# Patient Record
Sex: MALE | Race: BLACK OR AFRICAN AMERICAN | Employment: OTHER | URBAN - METROPOLITAN AREA
[De-identification: names, ages, dates, MRNs, and addresses within clinical notes are randomized per-mention and may not be internally consistent; named-entity substitution may affect disease eponyms.]

---

## 2021-06-18 ENCOUNTER — OFFICE VISIT (OUTPATIENT)
Dept: FAMILY MEDICINE CLINIC | Age: 61
End: 2021-06-18
Payer: COMMERCIAL

## 2021-06-18 VITALS
TEMPERATURE: 98.7 F | WEIGHT: 186 LBS | HEART RATE: 95 BPM | OXYGEN SATURATION: 95 % | RESPIRATION RATE: 16 BRPM | SYSTOLIC BLOOD PRESSURE: 115 MMHG | HEIGHT: 68 IN | DIASTOLIC BLOOD PRESSURE: 69 MMHG | BODY MASS INDEX: 28.19 KG/M2

## 2021-06-18 DIAGNOSIS — Z12.11 COLON CANCER SCREENING: ICD-10-CM

## 2021-06-18 DIAGNOSIS — N13.8 BPH WITH OBSTRUCTION/LOWER URINARY TRACT SYMPTOMS: Primary | ICD-10-CM

## 2021-06-18 DIAGNOSIS — N40.1 BPH WITH OBSTRUCTION/LOWER URINARY TRACT SYMPTOMS: Primary | ICD-10-CM

## 2021-06-18 PROCEDURE — 99213 OFFICE O/P EST LOW 20 MIN: CPT | Performed by: FAMILY MEDICINE

## 2021-06-18 RX ORDER — PHENOL/SODIUM PHENOLATE
20 AEROSOL, SPRAY (ML) MUCOUS MEMBRANE DAILY
COMMUNITY

## 2021-06-18 NOTE — PROGRESS NOTES
1. Have you been to the ER, urgent care clinic since your last visit? Hospitalized since your last visit? No    2. Have you seen or consulted any other health care providers outside of the [address] [place] [address] since your last visit? Include any pap smears or colon screening. No     6/18/2021      Chief Complaint   Patient presents with    Physical     Complete Physical, needs colonoscopy and routine BW         History of Present Illness:        Niki Hong is a 64 y.o. male here to discuss health maintenance and BPH. Stopped drinking this year, feels well. Lipids, BS in good range in 10/2019. Would like to do colon cancer screening. Nocturia x 2, otherwise feels great. Has to take PPI daily. No Known Allergies    Current Outpatient Medications   Medication Sig    Omeprazole delayed release (PRILOSEC D/R) 20 mg tablet Take 20 mg by mouth daily.  latanoprost (XALATAN) 0.005 % ophthalmic solution      No current facility-administered medications for this visit. Physical Examination:    Visit Vitals  /69 (BP 1 Location: Right arm, BP Patient Position: Sitting, BP Cuff Size: Adult)   Pulse 95   Temp 98.7 °F (37.1 °C) (Oral)   Resp 16   Ht 5' 8\" (1.727 m)   Wt 186 lb (84.4 kg)   SpO2 95%   BMI 28.28 kg/m²      General:  Alert, cooperative, no distress. HEENT:  Normocephalic, without obvious abnormality, atraumatic. Conjunctivae/corneas clear. Pupils equal, round, reactive to light. Extraocular movements intact. TMs and external canals normal bilaterally. Nasal mucosa and oropharynx clear. Lungs: Clear to auscultation bilaterally. Chest wall:  No tenderness or deformity. Heart:  Regular rate and rhythm, S1, S2 normal, no murmur, click, rub, or gallop. Abdomen:   Soft, non-tender. Bowel sounds normal. No masses. No organomegaly. Extremities: Extremities normal, atraumatic, no cyanosis or edema. Pulses: 2+ and symmetric all extremities.    Skin: Skin color, texture, turgor normal. No rashes or lesions. Lymph nodes: Cervical, supraclavicular, and axillary nodes normal.   Neurologic: CNII-XII intact. Normal strength, sensation, and reflexes throughout. ASSESSMENT AND PLAN    1. BPH with obstruction/lower urinary tract symptoms    - PSA W/ REFLX FREE PSA; Future    2. Colon cancer screening    - REFERRAL TO GENERAL SURGERY            Orders Placed This Encounter    PSA W/ REFLX FREE PSA     Standing Status:   Future     Standing Expiration Date:   6/18/2022    REFERRAL TO GENERAL SURGERY     Referral Priority:   Routine     Referral Type:   Consultation     Referral Reason:   Specialty Services Required     Referred to Provider:   Inocencio Urban MD     Requested Specialty:   General Surgery     Number of Visits Requested:   1    Omeprazole delayed release (PRILOSEC D/R) 20 mg tablet     Sig: Take 20 mg by mouth daily.            Homero Spears MD

## 2021-06-20 LAB
PSA SERPL-MCNC: 1 NG/ML (ref 0–4)
REFLEX CRITERIA: NORMAL

## 2021-08-25 ENCOUNTER — OFFICE VISIT (OUTPATIENT)
Dept: SURGERY | Age: 61
End: 2021-08-25

## 2021-08-25 VITALS
HEIGHT: 68 IN | BODY MASS INDEX: 29.4 KG/M2 | WEIGHT: 194 LBS | HEART RATE: 74 BPM | RESPIRATION RATE: 18 BRPM | DIASTOLIC BLOOD PRESSURE: 77 MMHG | SYSTOLIC BLOOD PRESSURE: 126 MMHG

## 2021-08-25 DIAGNOSIS — Z12.11 COLON CANCER SCREENING: Primary | ICD-10-CM

## 2021-08-25 NOTE — PATIENT INSTRUCTIONS

## 2021-08-25 NOTE — PROGRESS NOTES
Parris Jung is a 64 y.o. male who presents today with the following:  Chief Complaint   Patient presents with    Colon Cancer Screening       HPI  57-year-old male who presents as a referral from Dr. Armani Kim for possible screening colonoscopy. He has had no prior colon evaluation. He has no personal history of colon cancer. He has no first-degree relative who has had colon cancer although he does believe that he has maternal uncles and a maternal grandfather who may have had rectal or colon cancer. He states he used to have some rectal bleeding occasionally when he used to drink alcohol but he stopped this 6 months ago and has had no further bleeding. He denies any melena or hematochezia. He denies any diarrhea or constipation or abdominal pain or unexpected weight loss. He denies any change in the caliber of his stool and has not had any recent stool testing. It sounds like his had a laparoscopic cholecystectomy that was subsequently complicated post operatively by choledocholithiasis that presented 2 to 3 months later. He believes he may have had 2 ERCPs with removal of stone. He has a history of glaucoma. He has been vaccinated for Covid and has not contracted Covid as far as he knows. He stopped smoking cigars about 2 years ago. He stopped alcohol 6 months ago. He is not on any blood thinners. No past medical history on file.     Past Surgical History:   Procedure Laterality Date    HX CHOLECYSTECTOMY  2000       Social History     Socioeconomic History    Marital status: SINGLE     Spouse name: Not on file    Number of children: 3    Years of education: 8    Highest education level: Not on file   Occupational History    Occupation: labor   Tobacco Use    Smoking status: Former Smoker     Packs/day: 0.25     Years: 20.00     Pack years: 5.00    Smokeless tobacco: Never Used   Substance and Sexual Activity    Alcohol use: Not Currently     Comment: up to one beer daily    Drug use: Not Currently    Sexual activity: Yes   Other Topics Concern    Not on file   Social History Narrative    Not on file     Social Determinants of Health     Financial Resource Strain:     Difficulty of Paying Living Expenses:    Food Insecurity:     Worried About Running Out of Food in the Last Year:     [address] in the Last Year:    Transportation Needs:     Lack of Transportation (Medical):  Lack of Transportation (Non-Medical):    Physical Activity:     Days of Exercise per Week:     Minutes of Exercise per Session:    Stress:     Feeling of Stress :    Social Connections:     Frequency of Communication with Friends and Family:     Frequency of Social Gatherings with Friends and Family:     Attends Ca[employer] Services:     Active Member of Clubs or Organizations:     Attends Club or Organization Meetings:     Marital Status:    Intimate Partner Violence:     Fear of Current or Ex-Partner:     Emotionally Abused:     Physically Abused:     Sexually Abused:        Family History   Problem Relation Age of Onset    Diabetes Mother         type 2     Hypertension Mother     Lung Disease Father         asbestosis    No Known Problems Sister     No Known Problems Sister     No Known Problems Sister     No Known Problems Sister        No Known Allergies    Current Outpatient Medications   Medication Sig    Omeprazole delayed release (PRILOSEC D/R) 20 mg tablet Take 20 mg by mouth daily.  latanoprost (XALATAN) 0.005 % ophthalmic solution      No current facility-administered medications for this visit. The above histories, medications and allergies have been reviewed. Review of Systems   Respiratory: Positive for cough and wheezing. Gastrointestinal: Positive for abdominal pain and heartburn.        Visit Vitals  /77 (BP 1 Location: Left upper arm, BP Patient Position: At rest, BP Cuff Size: Adult)   Pulse 74   Resp 18   Ht 5' 8\" (1.727 m)   Wt 194 lb (88 kg)   BMI 29.50 kg/m²     Physical Exam  Constitutional:       Appearance: Normal appearance. Cardiovascular:      Rate and Rhythm: Normal rate and regular rhythm. Pulmonary:      Comments: And occasional wheeze but overall clear to auscultation  Abdominal:      General: There is no distension. Palpations: Abdomen is soft. There is no mass. Tenderness: There is no abdominal tenderness. Neurological:      Mental Status: He is alert. 1. Colon cancer screening  Recommend colonoscopy. The procedure was explained in detail including the risks and benefits. Risks shared included risks of missed lesions, incomplete exam, colon injury or perforation. Risks associated with anesthesia were also discussed. The patient wishes to proceed and we will schedule. The patient was counseled at length about the risks of chuy Covid-19 during their perioperative period and any recovery window from their procedure. The patient was made aware that chuy Covid-19  may worsen their prognosis for recovering from their procedure and lend to a higher morbidity and/or mortality risk. All material risks, benefits, and reasonable alternatives including postponing the procedure were discussed. The patient does  wish to proceed with the procedure at this time. Follow-up and Dispositions    · Return for post procedure.          Warden Efra MD

## 2021-08-27 ENCOUNTER — TELEPHONE (OUTPATIENT)
Dept: SURGERY | Age: 61
End: 2021-08-27

## 2021-08-27 NOTE — TELEPHONE ENCOUNTER
Spoke with Tameka from [place], no prior Auth required for Colonoscopy scheduled 9/21/21, [facility] does not give call ref #, Authorization form faxed to [phone]

## 2021-09-02 ENCOUNTER — ANESTHESIA EVENT (OUTPATIENT)
Dept: SURGERY | Age: 61
End: 2021-09-02
Payer: COMMERCIAL

## 2021-09-02 NOTE — ANESTHESIA PREPROCEDURE EVALUATION
Relevant Problems   No relevant active problems       Anesthetic History   No history of anesthetic complications            Review of Systems / Medical History  Patient summary reviewed, nursing notes reviewed and pertinent labs reviewed    Pulmonary              Pertinent negatives: Smoker: former.      Neuro/Psych   Within defined limits           Cardiovascular  Within defined limits                Exercise tolerance: <4 METS     GI/Hepatic/Renal     GERD: well controlled           Endo/Other  Within defined limits           Other Findings            Physical Exam    Airway  Mallampati: III  TM Distance: 4 - 6 cm  Neck ROM: normal range of motion   Mouth opening: Normal     Cardiovascular  Regular rate and rhythm,  S1 and S2 normal,  no murmur, click, rub, or gallop    Rate: normal         Dental         Pulmonary  Breath sounds clear to auscultation               Abdominal  GI exam deferred       Other Findings            Anesthetic Plan    ASA: 2  Anesthesia type: MAC            Anesthetic plan and risks discussed with: Patient

## 2021-09-15 ENCOUNTER — HOSPITAL ENCOUNTER (OUTPATIENT)
Dept: PREADMISSION TESTING | Age: 61
Discharge: HOME | End: 2021-09-15

## 2021-09-15 NOTE — PERIOP NOTES
[address] [place] [address]  SURGICAL PRE-ADMISSION INSTRUCTIONS    ARRIVAL  · You will be called the day before your surgery with your expected arrival time. · Sign in at the  of the hospital.  You will be directed to the Surgical Waiting Room. · Please arrive at your scheduled appointment time. You have been scheduled to arrive for your procedure one or two hours prior to the expected start time of your procedure. · Every effort will be made to minimize your wait but please be aware that unforeseen circumstances may affect our schedule. EATING  · DO NOT EAT OR DRINK ANYTHING AFTER MIDNIGHT ON THE EVENING BEFORE YOUR SURGERY OR ON THE DAY OF YOUR SURGERY except for your medications (as instructed) with a sip of water. · Do not use gum, mints or lozenges on the morning of your surgery. · Please do not smoke or chew tobacco before your surgery. MEDICATIONS   · Take the following medications on the morning of your surgery with the smallest amount of water possible : Pantoprazole    STOP THESE MEDICATIONS AT THE TIMES LISTED BELOW  none     DRIVING/TRANSPORATION  · Have a responsible adult to drive you home from the hospital and to stay with you over night. Please have them plan to remain in the hospital during your surgery. Your surgery will not be done if you do not have a responsible adult to take you home and to stay with you. · If you have arranged for public transport, you must have a responsible adult to ride with you (who is not the ). · You may not drive for 24 hours after anesthesia. PREPARATION  · If you have a Living WiIl/Advance Directive, please bring a copy with you to scan into your chart. · Please DO NOT wear makeup or nail polish  · Please leave valuables at home,  DO NOT wear jewelry. · Wear loose, comfortable clothing that is large enough to cover a bulky dressing.     SPECIAL INSTRUCTIONS:  · Follow your surgeon's instructions for preoperative bowel prep.     Reviewed above preoperative instructions and answered questions by phone interview    Patient:  Tin Del   Date:     September 15, 2021  Time:   2:14 PM    RN:  Odalis Henry RN    Date:     September 15, 2021  Time:   2:14 PM

## 2021-09-17 ENCOUNTER — HOSPITAL ENCOUNTER (OUTPATIENT)
Dept: PREADMISSION TESTING | Age: 61
Discharge: HOME | End: 2021-09-17
Payer: COMMERCIAL

## 2021-09-17 PROCEDURE — U0005 INFEC AGEN DETEC AMPLI PROBE: HCPCS

## 2021-09-20 LAB
SARS-COV-2, XPLCVT: NOT DETECTED
SOURCE, COVRS: NORMAL

## 2021-09-20 NOTE — H&P
History and Physical    HPI  31-year-old male who presents as a referral from Dr. Neville Abdi for possible screening colonoscopy. He has had no prior colon evaluation. He has no personal history of colon cancer. He has no first-degree relative who has had colon cancer although he does believe that he has maternal uncles and a maternal grandfather who may have had rectal or colon cancer. He states he used to have some rectal bleeding occasionally when he used to drink alcohol but he stopped this 6 months ago and has had no further bleeding. He denies any melena or hematochezia. He denies any diarrhea or constipation or abdominal pain or unexpected weight loss. He denies any change in the caliber of his stool and has not had any recent stool testing. It sounds like his had a laparoscopic cholecystectomy that was subsequently complicated post operatively by choledocholithiasis that presented 2 to 3 months later. He believes he may have had 2 ERCPs with removal of stone. He has a history of glaucoma. He has been vaccinated for Covid and has not contracted Covid as far as he knows. He stopped smoking cigars about 2 years ago. He stopped alcohol 6 months ago. He is not on any blood thinners. No past medical history on file.     Past Surgical History:   Procedure Laterality Date    HX CHOLECYSTECTOMY  2000       Social History     Socioeconomic History    Marital status: SINGLE     Spouse name: Not on file    Number of children: 3    Years of education: 8    Highest education level: Not on file   Occupational History    Occupation: labor   Tobacco Use    Smoking status: Former Smoker     Packs/day: 0.25     Years: 20.00     Pack years: 5.00    Smokeless tobacco: Never Used   Substance and Sexual Activity    Alcohol use: Not Currently     Comment: up to one beer daily    Drug use: Not Currently    Sexual activity: Yes   Other Topics Concern    Not on file   Social History Narrative    Not on file     Social Determinants of Health     Financial Resource Strain:     Difficulty of Paying Living Expenses:    Food Insecurity:     Worried About Running Out of Food in the Last Year:     [address] in the Last Year:    Transportation Needs:     Lack of Transportation (Medical):  Lack of Transportation (Non-Medical):    Physical Activity:     Days of Exercise per Week:     Minutes of Exercise per Session:    Stress:     Feeling of Stress :    Social Connections:     Frequency of Communication with Friends and Family:     Frequency of Social Gatherings with Friends and Family:     Attends Adventism Services:     Active Member of Clubs or Organizations:     Attends Club or Organization Meetings:     Marital Status:    Intimate Partner Violence:     Fear of Current or Ex-Partner:     Emotionally Abused:     Physically Abused:     Sexually Abused:        Family History   Problem Relation Age of Onset    Diabetes Mother         type 2     Hypertension Mother     Lung Disease Father         asbestosis    No Known Problems Sister     No Known Problems Sister     No Known Problems Sister     No Known Problems Sister        No Known Allergies    No current facility-administered medications for this encounter. Current Outpatient Medications   Medication Sig    Omeprazole delayed release (PRILOSEC D/R) 20 mg tablet Take 20 mg by mouth daily.  latanoprost (XALATAN) 0.005 % ophthalmic solution        The above histories, medications and allergies have been reviewed. Review of Systems   Respiratory: Positive for cough and wheezing. Gastrointestinal: Positive for abdominal pain and heartburn. There were no vitals taken for this visit. Physical Exam  Constitutional:       Appearance: Normal appearance. Cardiovascular:      Rate and Rhythm: Normal rate and regular rhythm. Pulmonary:      Comments:  And occasional wheeze but overall clear to auscultation  Abdominal: General: There is no distension. Palpations: Abdomen is soft. There is no mass. Tenderness: There is no abdominal tenderness. Neurological:      Mental Status: He is alert. 1. Colon cancer screening  Recommend colonoscopy. The procedure was explained in detail including the risks and benefits. Risks shared included risks of missed lesions, incomplete exam, colon injury or perforation. Risks associated with anesthesia were also discussed. The patient wishes to proceed and we will schedule. The patient was counseled at length about the risks of chuy Covid-19 during their perioperative period and any recovery window from their procedure. The patient was made aware that chuy Covid-19  may worsen their prognosis for recovering from their procedure and lend to a higher morbidity and/or mortality risk. All material risks, benefits, and reasonable alternatives including postponing the procedure were discussed. The patient does  wish to proceed with the procedure at this time.               Jie Carvalho MD

## 2021-09-21 ENCOUNTER — HOSPITAL ENCOUNTER (OUTPATIENT)
Age: 61
Setting detail: OUTPATIENT SURGERY
Discharge: HOME | End: 2021-09-21
Attending: SURGERY | Admitting: SURGERY
Payer: COMMERCIAL

## 2021-09-21 ENCOUNTER — ANESTHESIA (OUTPATIENT)
Dept: SURGERY | Age: 61
End: 2021-09-21
Payer: COMMERCIAL

## 2021-09-21 VITALS
BODY MASS INDEX: 29.4 KG/M2 | SYSTOLIC BLOOD PRESSURE: 116 MMHG | OXYGEN SATURATION: 94 % | HEART RATE: 79 BPM | TEMPERATURE: 97 F | HEIGHT: 68 IN | RESPIRATION RATE: 19 BRPM | WEIGHT: 194 LBS | DIASTOLIC BLOOD PRESSURE: 80 MMHG

## 2021-09-21 DIAGNOSIS — Z12.11 COLON CANCER SCREENING: ICD-10-CM

## 2021-09-21 PROCEDURE — 74011250636 HC RX REV CODE- 250/636: Performed by: ANESTHESIOLOGY

## 2021-09-21 PROCEDURE — 88305 TISSUE EXAM BY PATHOLOGIST: CPT

## 2021-09-21 PROCEDURE — 2709999900 HC NON-CHARGEABLE SUPPLY: Performed by: SURGERY

## 2021-09-21 PROCEDURE — 45380 COLONOSCOPY AND BIOPSY: CPT | Performed by: SURGERY

## 2021-09-21 PROCEDURE — 74011000250 HC RX REV CODE- 250: Performed by: ANESTHESIOLOGY

## 2021-09-21 PROCEDURE — 76210000063 HC OR PH I REC FIRST 0.5 HR: Performed by: SURGERY

## 2021-09-21 PROCEDURE — 76060000031 HC ANESTHESIA FIRST 0.5 HR: Performed by: SURGERY

## 2021-09-21 PROCEDURE — 76010000154 HC OR TIME FIRST 0.5 HR: Performed by: SURGERY

## 2021-09-21 PROCEDURE — 74011250636 HC RX REV CODE- 250/636: Performed by: SURGERY

## 2021-09-21 PROCEDURE — 77030037006 HC FCPS BIOP ENDOSC DISP OCOA -A: Performed by: SURGERY

## 2021-09-21 RX ORDER — OXYCODONE AND ACETAMINOPHEN 5; 325 MG/1; MG/1
1 TABLET ORAL
Status: DISCONTINUED | OUTPATIENT
Start: 2021-09-21 | End: 2021-09-21 | Stop reason: HOSPADM

## 2021-09-21 RX ORDER — SODIUM CHLORIDE 0.9 % (FLUSH) 0.9 %
5-40 SYRINGE (ML) INJECTION EVERY 8 HOURS
Status: DISCONTINUED | OUTPATIENT
Start: 2021-09-21 | End: 2021-09-21 | Stop reason: HOSPADM

## 2021-09-21 RX ORDER — MIDAZOLAM HYDROCHLORIDE 1 MG/ML
INJECTION, SOLUTION INTRAMUSCULAR; INTRAVENOUS AS NEEDED
Status: DISCONTINUED | OUTPATIENT
Start: 2021-09-21 | End: 2021-09-21 | Stop reason: HOSPADM

## 2021-09-21 RX ORDER — SODIUM CHLORIDE 0.9 % (FLUSH) 0.9 %
5-40 SYRINGE (ML) INJECTION AS NEEDED
Status: DISCONTINUED | OUTPATIENT
Start: 2021-09-21 | End: 2021-09-21 | Stop reason: HOSPADM

## 2021-09-21 RX ORDER — SODIUM CHLORIDE, SODIUM LACTATE, POTASSIUM CHLORIDE, CALCIUM CHLORIDE 600; 310; 30; 20 MG/100ML; MG/100ML; MG/100ML; MG/100ML
125 INJECTION, SOLUTION INTRAVENOUS CONTINUOUS
Status: DISCONTINUED | OUTPATIENT
Start: 2021-09-21 | End: 2021-09-21 | Stop reason: HOSPADM

## 2021-09-21 RX ORDER — ONDANSETRON 2 MG/ML
INJECTION INTRAMUSCULAR; INTRAVENOUS AS NEEDED
Status: DISCONTINUED | OUTPATIENT
Start: 2021-09-21 | End: 2021-09-21 | Stop reason: HOSPADM

## 2021-09-21 RX ORDER — PROPOFOL 10 MG/ML
INJECTION, EMULSION INTRAVENOUS AS NEEDED
Status: DISCONTINUED | OUTPATIENT
Start: 2021-09-21 | End: 2021-09-21 | Stop reason: HOSPADM

## 2021-09-21 RX ORDER — LIDOCAINE HYDROCHLORIDE 20 MG/ML
INJECTION, SOLUTION EPIDURAL; INFILTRATION; INTRACAUDAL; PERINEURAL AS NEEDED
Status: DISCONTINUED | OUTPATIENT
Start: 2021-09-21 | End: 2021-09-21 | Stop reason: HOSPADM

## 2021-09-21 RX ADMIN — PROPOFOL 50 MG: 10 INJECTION, EMULSION INTRAVENOUS at 09:27

## 2021-09-21 RX ADMIN — PROPOFOL 10 MG: 10 INJECTION, EMULSION INTRAVENOUS at 09:44

## 2021-09-21 RX ADMIN — PROPOFOL 20 MG: 10 INJECTION, EMULSION INTRAVENOUS at 09:34

## 2021-09-21 RX ADMIN — PROPOFOL 10 MG: 10 INJECTION, EMULSION INTRAVENOUS at 09:50

## 2021-09-21 RX ADMIN — PROPOFOL 10 MG: 10 INJECTION, EMULSION INTRAVENOUS at 09:46

## 2021-09-21 RX ADMIN — SODIUM CHLORIDE, POTASSIUM CHLORIDE, SODIUM LACTATE AND CALCIUM CHLORIDE 125 ML/HR: 600; 310; 30; 20 INJECTION, SOLUTION INTRAVENOUS at 08:55

## 2021-09-21 RX ADMIN — PROPOFOL 20 MG: 10 INJECTION, EMULSION INTRAVENOUS at 09:40

## 2021-09-21 RX ADMIN — PROPOFOL 20 MG: 10 INJECTION, EMULSION INTRAVENOUS at 09:29

## 2021-09-21 RX ADMIN — MIDAZOLAM 2 MG: 1 INJECTION INTRAMUSCULAR; INTRAVENOUS at 09:24

## 2021-09-21 RX ADMIN — PROPOFOL 20 MG: 10 INJECTION, EMULSION INTRAVENOUS at 09:32

## 2021-09-21 RX ADMIN — ONDANSETRON 4 MG: 2 INJECTION INTRAMUSCULAR; INTRAVENOUS at 09:24

## 2021-09-21 RX ADMIN — PROPOFOL 20 MG: 10 INJECTION, EMULSION INTRAVENOUS at 09:38

## 2021-09-21 RX ADMIN — PROPOFOL 20 MG: 10 INJECTION, EMULSION INTRAVENOUS at 09:30

## 2021-09-21 RX ADMIN — PROPOFOL 20 MG: 10 INJECTION, EMULSION INTRAVENOUS at 09:42

## 2021-09-21 RX ADMIN — LIDOCAINE HYDROCHLORIDE 80 MG: 20 INJECTION, SOLUTION EPIDURAL; INFILTRATION; INTRACAUDAL; PERINEURAL at 09:26

## 2021-09-21 RX ADMIN — PROPOFOL 20 MG: 10 INJECTION, EMULSION INTRAVENOUS at 09:36

## 2021-09-21 RX ADMIN — PROPOFOL 20 MG: 10 INJECTION, EMULSION INTRAVENOUS at 09:48

## 2021-09-21 NOTE — DISCHARGE INSTRUCTIONS
Patient Education        Colonoscopy: What to Expect at Home  Your Recovery  After a colonoscopy, you'll stay at the clinic until you wake up. Then you can go home. But you'll need to arrange for a ride. Your doctor will tell you when you can eat and do your other usual activities. Your doctor will talk to you about when you'll need your next colonoscopy. Your doctor can help you decide how often you need to be checked. This will depend on the results of your test and your risk for colorectal cancer. After the test, you may be bloated or have gas pains. You may need to pass gas. If a biopsy was done or a polyp was removed, you may have streaks of blood in your stool (feces) for a few days. Problems such as heavy rectal bleeding may not occur until several weeks after the test. This isn't common. But it can happen after polyps are removed. This care sheet gives you a general idea about how long it will take for you to recover. But each person recovers at a different pace. Follow the steps below to get better as quickly as possible. How can you care for yourself at home? Activity    · Rest when you feel tired.     · You can do your normal activities when it feels okay to do so. Diet    · Follow your doctor's directions for eating.     · Unless your doctor has told you not to, drink plenty of fluids. This helps to replace the fluids that were lost during the colon prep.     · Do not drink alcohol. Medicines    · Your doctor will tell you if and when you can restart your medicines. You will also be given instructions about taking any new medicines.     · If you take aspirin or some other blood thinner, ask your doctor if and when to start taking it again. Make sure that you understand exactly what your doctor wants you to do.     · If polyps were removed or a biopsy was done during the test, your doctor may tell you not to take aspirin or other anti-inflammatory medicines for a few days.  These include ibuprofen (Advil, Motrin) and naproxen (Aleve). Other instructions    · For your safety, do not drive or operate machinery until the medicine wears off and you can think clearly. Your doctor may tell you not to drive or operate machinery until the day after your test.     · Do not sign legal documents or make major decisions until the medicine wears off and you can think clearly. The anesthesia can make it hard for you to fully understand what you are agreeing to. Follow-up care is a key part of your treatment and safety. Be sure to make and go to all appointments, and call your doctor if you are having problems. It's also a good idea to know your test results and keep a list of the medicines you take. When should you call for help? Call 911 anytime you think you may need emergency care. For example, call if:    · You passed out (lost consciousness).     · You pass maroon or bloody stools.     · You have trouble breathing. Call your doctor now or seek immediate medical care if:    · You have pain that does not get better after you take pain medicine.     · You are sick to your stomach or cannot drink fluids.     · You have new or worse belly pain.     · You have blood in your stools.     · You have a fever.     · You cannot pass stools or gas. Watch closely for changes in your health, and be sure to contact your doctor if you have any problems. Where can you learn more? Go to http://www.gray.com/  Enter E264 in the search box to learn more about \"Colonoscopy: What to Expect at Home. \"  Current as of: December 17, 2020               Content Version: 13.0  © 9433-6037 [employer]. Care instructions adapted under license by [employer] (which disclaims liability or warranty for this information).  If you have questions about a medical condition or this instruction, always ask your healthcare professional. Chano Mcneal disclaims any warranty or liability for your use of this information.

## 2021-09-21 NOTE — OP NOTES
[facility]  OPERATIVE REPORT    Name:  Vivek Cueva  MR#:  [id number]  :  1960  ACCOUNT #:  [de-identified]  DATE OF SERVICE:  2021    PREOPERATIVE DIAGNOSIS:  Screening colonoscopy. POSTOPERATIVE DIAGNOSIS:  Screening colonoscopy. PROCEDURE PERFORMED:  Colonoscopy with cold biopsy polypectomy x2. SURGEON:  Jocelyne Beavers MD    ASSISTANT:  None    ANESTHESIA:  MAC.    COMPLICATIONS:  None. SPECIMENS REMOVED:  1. Polyp at 25 cm. 2.  Polyp at 20 cm. IMPLANTS:  None    ESTIMATED BLOOD LOSS:  Minimal.    FINDINGS:  1.  Polyp at 25 cm. 2.  Polyp at 20 cm. DISPOSITION:  Stable. PROCEDURE:  The patient was brought to the operative theater. Monitoring devices and nasal cannula O2 were placed per Anesthesia, and the patient was placed in left side down decubitus position. IV sedation was administered, and a time-out was performed. Digital rectal exam was performed which was essentially normal.  A well-lubricated Olympus colonoscope was introduced in the patient's rectum and advanced to the cecum without any difficulty. The cecum was identified by identification of ileocecal valve and the appendiceal orifice. The prep was adequate to proceed. The scope was then carefully withdrawn with the mucosa circumferentially evaluated. No significant abnormalities were noted in the cecum, ascending colon, transverse colon, or descending colon. No significant lesions were identified in the sigmoid colon. At 25 cm, we identified a small polypoid lesion which was removed in its entirety by cold biopsy forceps with specimen retrieved. A similar-type lesion was found at 20 cm and removed in a similar fashion. The scope was then pulled back into the rectum and retroflexed without any additional lesions seen. The scope was then carefully withdrawn. The patient tolerated procedure well and was transferred to PACU in stable condition.       Maria Luisa Mcmillan, MD [id number]  D:  09/21/2021 9:59  T:  09/21/2021 12:09  JOB #:  [id number]  CC:  Baldo Queen MD

## 2021-09-21 NOTE — INTERVAL H&P NOTE
Update History & Physical    The Patient's History and Physical of September 20, 2021 was reviewed with the patient and I examined the patient. There was no change. The surgical site was confirmed by the patient and me. Plan:  The risk, benefits, expected outcome, and alternative to the recommended procedure have been discussed with the patient. Patient understands and wants to proceed with the procedure.     Electronically signed by Meghann Hagan MD on 9/21/2021 at 9:18 AM
no hematuria/no flank pain R/no renal colic/no urine discoloration/no urinary hesitancy/normal urinary frequency/no nocturia/no flank pain L

## 2021-09-21 NOTE — ANESTHESIA POSTPROCEDURE EVALUATION
Post-Anesthesia Evaluation and Assessment    Cardiovascular Function/Vital Signs  Visit Vitals  BP 99/78   Pulse 77   Temp 36.1 °C (97 °F)   Resp 15   Ht 5' 8\" (1.727 m)   Wt 88 kg (194 lb)   SpO2 96%   BMI 29.50 kg/m²       Patient is status post Procedure(s) with comments:  COLONOSCOPY - with Cold Forcep Polypectomy of Polyp at 25cm and Polyp at 20cm. Nausea/Vomiting: Controlled. Postoperative hydration reviewed and adequate. Pain:  Pain Scale 1: Numeric (0 - 10) (09/21/21 1000)  Pain Intensity 1: 0 (09/21/21 1000)   Managed. Neurological Status:   Neuro (WDL): Within Defined Limits (09/21/21 1000)   At baseline. Mental Status and Level of Consciousness: Baseline and stable. Pulmonary Status:   O2 Device: CO2 nasal cannula (09/21/21 1000)   Adequate oxygenation and airway patent. Complications related to anesthesia: None    Post-anesthesia assessment completed. No concerns. Patient has met all discharge requirements.     Signed By: Richard Prakash MD

## 2021-09-21 NOTE — BRIEF OP NOTE
Brief Postoperative Note    Patient: Nicola Fernandez  YOB: 1960  MRN: [id number]    Date of Procedure: 9/21/2021     Pre-Op Diagnosis: Screening Colonoscopy    Post-Op Diagnosis: Same as preoperative diagnosis. Procedure(s):  COLONOSCOPY with cold biopsy polypectomy X 2    Surgeon(s):  Montrell Gamez MD    Surgical Assistant: None    Anesthesia: MAC     Estimated Blood Loss (mL): Minimal    Complications: None    Specimens:   ID Type Source Tests Collected by Time Destination   1 : Polyp at 25cm Preservative Colon  Montrell Gamez MD 9/21/2021 3268 Pathology   2 : Polyp at 20cm Preservative Colon  Montrell Gamez MD 9/21/2021 7674 Pathology        Implants: * No implants in log *    Drains: * No LDAs found *    Findings: 1.  Polyps at 25 cm & 20 cm    Electronically Signed by Tanmay Tolbert MD on 9/21/2021 at 9:56 AM

## 2021-09-29 ENCOUNTER — OFFICE VISIT (OUTPATIENT)
Dept: FAMILY MEDICINE CLINIC | Age: 61
End: 2021-09-29
Payer: COMMERCIAL

## 2021-09-29 VITALS
BODY MASS INDEX: 29.89 KG/M2 | WEIGHT: 197.25 LBS | OXYGEN SATURATION: 95 % | HEIGHT: 68 IN | DIASTOLIC BLOOD PRESSURE: 69 MMHG | HEART RATE: 81 BPM | TEMPERATURE: 97.3 F | SYSTOLIC BLOOD PRESSURE: 135 MMHG | RESPIRATION RATE: 18 BRPM

## 2021-09-29 DIAGNOSIS — Z23 ENCOUNTER FOR IMMUNIZATION: ICD-10-CM

## 2021-09-29 DIAGNOSIS — R06.2 WHEEZING ON EXPIRATION: Primary | ICD-10-CM

## 2021-09-29 PROBLEM — D12.6 ADENOMATOUS POLYP OF COLON: Status: ACTIVE | Noted: 2021-09-21

## 2021-09-29 PROCEDURE — 90471 IMMUNIZATION ADMIN: CPT | Performed by: FAMILY MEDICINE

## 2021-09-29 PROCEDURE — 94640 AIRWAY INHALATION TREATMENT: CPT | Performed by: FAMILY MEDICINE

## 2021-09-29 PROCEDURE — 90686 IIV4 VACC NO PRSV 0.5 ML IM: CPT | Performed by: FAMILY MEDICINE

## 2021-09-29 PROCEDURE — 99214 OFFICE O/P EST MOD 30 MIN: CPT | Performed by: FAMILY MEDICINE

## 2021-09-29 RX ORDER — PREDNISONE 20 MG/1
TABLET ORAL
Qty: 18 TABLET | Refills: 0 | Status: SHIPPED | OUTPATIENT
Start: 2021-09-29 | End: 2021-10-08

## 2021-09-29 RX ORDER — ALBUTEROL SULFATE 0.83 MG/ML
2.5 SOLUTION RESPIRATORY (INHALATION) ONCE
Qty: 1 EACH | Refills: 0
Start: 2021-09-29 | End: 2021-09-29

## 2021-09-29 RX ORDER — ALBUTEROL SULFATE 90 UG/1
2 AEROSOL, METERED RESPIRATORY (INHALATION)
Qty: 18 G | Refills: 1 | Status: SHIPPED | OUTPATIENT
Start: 2021-09-29

## 2021-09-29 NOTE — PROGRESS NOTES
1. Have you been to the ER, urgent care clinic since your last visit? Hospitalized since your last visit? Yes - 9/21/21 - Colonoscopy Dr Rajinder Oh     2. Have you seen or consulted any other health care providers outside of the [address] [place] [address] since your last visit? Include any pap smears or colon screening. No     9/29/2021      Chief Complaint   Patient presents with    Wheezing     x 4 months     Shortness of Breath         History of Present Illness:        Justyna Cuba is a 64 y.o. male who has noted audible wheezing and some SOB, primarily at night over the last few weeks. Helped someone move prior to this and may have been exposed to allergens, woman also had a cough. He has no cough, fever or URI sxs. No hx of childhood asthma, used to wheeze some when he was smoking. Feels well otherwise. Just had colonoscopy with a single adenomatous polyp. No Known Allergies    Current Outpatient Medications   Medication Sig    albuterol (PROVENTIL VENTOLIN) 2.5 mg /3 mL (0.083 %) nebu 3 mL by Nebulization route once for 1 dose.  albuterol (PROVENTIL HFA, VENTOLIN HFA, PROAIR HFA) 90 mcg/actuation inhaler Take 2 Puffs by inhalation every four (4) hours as needed for Wheezing.  inhalational spacing device 1 Each by Does Not Apply route as needed for Wheezing.  predniSONE (DELTASONE) 20 mg tablet Take 60 mg by mouth daily for 3 days, THEN 40 mg daily for 3 days, THEN 20 mg daily for 3 days.  Omeprazole delayed release (PRILOSEC D/R) 20 mg tablet Take 20 mg by mouth daily.  latanoprost (XALATAN) 0.005 % ophthalmic solution      No current facility-administered medications for this visit.            Physical Examination:    Visit Vitals  /69 (BP 1 Location: Left upper arm, BP Patient Position: Sitting, BP Cuff Size: Adult)   Pulse 81   Temp 97.3 °F (36.3 °C) (Oral)   Resp 18   Ht 5' 8\" (1.727 m)   Wt 197 lb 4 oz (89.5 kg)   SpO2 95%   BMI 29.99 kg/m²      General:  Alert, cooperative, no distress. HEENT:  Normocephalic, without obvious abnormality, atraumatic. Conjunctivae/corneas clear. Pupils equal, round, reactive to light. Extraocular movements intact. TMs and external canals normal bilaterally. Nasal mucosa and oropharynx clear. Lungs: Symmetric bilateral mid field wheezes. No crackles. Significant improvement post nebulizer. Chest wall:  No tenderness or deformity. Heart:  Regular rate and rhythm, S1, S2 normal, no murmur, click, rub, or gallop. Abdomen:   Soft, non-tender. Bowel sounds normal. No masses. No organomegaly. Extremities: Extremities normal, atraumatic, no cyanosis or edema. Pulses: 2+ and symmetric all extremities. Skin: Skin color, texture, turgor normal. No rashes or lesions. Lymph nodes: Cervical, supraclavicular, and axillary nodes normal.   Neurologic: CNII-XII intact. Normal strength, sensation, and reflexes throughout. ASSESSMENT AND PLAN    1. Wheezing on expiration  b agonist trial. PFTs if not prompt resolution with steroid and normal CXR. Return if not resolved in 3-4 seeks  - albuterol (PROVENTIL VENTOLIN) 2.5 mg /3 mL (0.083 %) nebu; 3 mL by Nebulization route once for 1 dose. Dispense: 1 Each; Refill: 0  - ALBUTEROL, INHAL. SOL., FDA-APPROVED FINAL, NON-COMPOUND UNIT DOSE, 1 MG  - INHAL RX, AIRWAY OBST/DX SPUTUM INDUCT  - XR CHEST PA LAT; Future  - albuterol (PROVENTIL HFA, VENTOLIN HFA, PROAIR HFA) 90 mcg/actuation inhaler; Take 2 Puffs by inhalation every four (4) hours as needed for Wheezing. Dispense: 18 g; Refill: 1  - inhalational spacing device; 1 Each by Does Not Apply route as needed for Wheezing. Dispense: 1 Each; Refill: 0  - predniSONE (DELTASONE) 20 mg tablet; Take 60 mg by mouth daily for 3 days, THEN 40 mg daily for 3 days, THEN 20 mg daily for 3 days. Dispense: 18 Tablet; Refill: 0    2. Encounter for immunization    - INFLUENZA VIRUS VAC QUAD,SPLIT,PRESV FREE SYRINGE IM    3.  Colon polyp      Orders Placed This Encounter  INHAL RX, AIRWAY OBST/DX SPUTUM INDUCT ([id number])    XR CHEST PA LAT     Standing Status:   Future     Standing Expiration Date:   10/30/2022     Order Specific Question:   Which facility to perform procedure? Answer:   [id number] [facility] VACCINE QUADRIVALENT, PRESERVATIVE FREE SYRINGE ([id number])    ALBUTEROL, INHAL. WWS.()     Order Specific Question:   Dose     Answer:   mg/3 mL     Order Specific Question:   Site     Answer:   OTHER     Comments:   inhalation     Order Specific Question:   Expiration Date     Answer:   2022     Order Specific Question:   Lot#     Answer:   [id number]     Order Specific Question:        Answer:   Nephron     Order Specific Question:   Charge Quantity? Answer:   1     Order Specific Question:   Perfomed by/Witnessed by: Elver Munguia RN     Order Specific Question:   NDC#     Answer:   [id number] [[id number]]    albuterol (PROVENTIL VENTOLIN) 2.5 mg /3 mL (0.083 %) nebu     Sig: 3 mL by Nebulization route once for 1 dose. Dispense:  1 Each     Refill:  0    albuterol (PROVENTIL HFA, VENTOLIN HFA, PROAIR HFA) 90 mcg/actuation inhaler     Sig: Take 2 Puffs by inhalation every four (4) hours as needed for Wheezing. Dispense:  18 g     Refill:  1    inhalational spacing device     Si Each by Does Not Apply route as needed for Wheezing. Dispense:  1 Each     Refill:  0    predniSONE (DELTASONE) 20 mg tablet     Sig: Take 60 mg by mouth daily for 3 days, THEN 40 mg daily for 3 days, THEN 20 mg daily for 3 days.      Dispense:  18 Tablet     Refill:  0           Estefania Villar MD

## 2021-09-29 NOTE — PATIENT INSTRUCTIONS
Wheezing or Bronchoconstriction: Care Instructions  Your Care Instructions  Wheezing is a whistling noise made during breathing. It occurs when the small airways, or bronchial tubes, that lead to your lungs swell or contract (spasm) and become narrow. This narrowing is called bronchoconstriction. When your airways constrict, it is hard for air to pass through and this makes it hard for you to breathe. Wheezing and bronchoconstriction can be caused by many problems, including:  · An infection such as the flu or a cold. · Allergies such as hay fever. · Diseases such as asthma or chronic obstructive pulmonary disease. · Smoking. Treatment for your wheezing depends on what is causing the problem. Your wheezing may get better without treatment. But you may need to pay attention to things that cause your wheezing and avoid them. Or you may need medicine to help treat the wheezing and to reduce the swelling or to relieve spasms in your lungs. Follow-up care is a key part of your treatment and safety. Be sure to make and go to all appointments, and call your doctor if you are having problems. It is also a good idea to know your test results and keep a list of the medicines you take. How can you care for yourself at home? · Take your medicine exactly as prescribed. Call your doctor if you think you are having a problem with your medicine. You will get more details on the specific medicine your doctor prescribes. · If your doctor prescribed antibiotics, take them as directed. Do not stop taking them just because you feel better. You need to take the full course of antibiotics. · Breathe moist air from a humidifier, hot shower, or sink filled with hot water. This may help ease your symptoms and make it easier for you to breathe. · If you have congestion in your nose and throat, drinking plenty of fluids, especially hot fluids, may help relieve your symptoms.  If you have kidney, heart, or liver disease and have to limit fluids, talk with your doctor before you increase the amount of fluids you drink. · If you have mucus in your airways, it may help to breathe deeply and cough. · Do not smoke or allow others to smoke around you. Smoking can make your wheezing worse. If you need help quitting, talk to your doctor about stop-smoking programs and medicines. These can increase your chances of quitting for good. · Avoid things that may cause your wheezing. These may include colds, smoke, air pollution, dust, pollen, pets, cockroaches, stress, and cold air. When should you call for help? Call 911 anytime you think you may need emergency care. For example, call if:    · You have severe trouble breathing.     · You passed out (lost consciousness). Call your doctor now or seek immediate medical care if:    · You cough up yellow, dark brown, or bloody mucus (sputum).     · You have new or worse shortness of breath.     · Your wheezing is not getting better or it gets worse after you start taking your medicine. Watch closely for changes in your health, and be sure to contact your doctor if:    · You do not get better as expected. Where can you learn more? Go to http://www.gray.com/  Enter V454 in the search box to learn more about \"Wheezing or Bronchoconstriction: Care Instructions. \"  Current as of: July 6, 2021               Content Version: 13.0  © 5886-9488 Healthwise, Incorporated. Care instructions adapted under license by [employer] (which disclaims liability or warranty for this information). If you have questions about a medical condition or this instruction, always ask your healthcare professional. [place] [address] any warranty or liability for your use of this information. Learning About Using Inhalers Correctly  Why is an inhaler used? An inhaler is used to send medicine right to your lungs.  It's often used for asthma, COPD (chronic obstructive pulmonary disease), and other lung diseases that make it hard to breathe. Using an inhaler:  · Sends most of the medicine straight to your lungs. · Provides a measured dose of the medicine. · Can help keep your symptoms under control. · Can limit long-term damage to your lungs. · Can be safer than if you took a pill or liquid medicine. · Works just as well, is easier to carry, and is faster to use than a nebulizer machine. What devices can you use? Different types of devices can send inhaled medicine straight to your lungs. They include metered-dose inhalers and dry powder inhalers. You may need to use more than one type of device. How can you use it correctly? Each kind of inhaler is used differently. Make sure to save the 's instructions. Don't throw them away. Pay attention to the instructions for your specific inhaler. Look for directions on:  · Whether to prime or shake it. · Whether to use a spacer or mask. · Whether you have to load medicine into the inhaler. · How to use the inhaler to deliver the medicine. This includes how to hold it, when to breathe, and how long to hold your breath. · How to know how many doses are left. · How to clean it. · How to store it. · When to throw it away. Why is it important to use it correctly? Correct use of an inhaler:  · Makes sure that the lungs get the full dose. This can mean fewer symptoms and better treatment. · Leads to fewer side effects. Sometimes people have side effects if the medicine hits the inside of the mouth instead of going into the lungs. · Saves money. Ask your doctor, nurse, pharmacist, or respiratory therapist to show you how to use the inhaler. They might ask you to show them how you use it, so they can help fix any problems. Follow-up care is a key part of your treatment and safety. Be sure to make and go to all appointments, and call your doctor if you are having problems.  It's also a good idea to know your test results and keep a list of the medicines you take. Where can you learn more? Go to http://www.gray.com/  Enter I150 in the search box to learn more about \"Learning About Using Inhalers Correctly. \"  Current as of: July 6, 2021               Content Version: 13.0  © 9594-3639 [employer]. Care instructions adapted under license by [employer] (which disclaims liability or warranty for this information). If you have questions about a medical condition or this instruction, always ask your healthcare professional. [address] any warranty or liability for your use of this information. Learning About Using Inhalers Correctly  Why is an inhaler used? An inhaler is used to send medicine right to your lungs. It's often used for asthma, COPD (chronic obstructive pulmonary disease), and other lung diseases that make it hard to breathe. Using an inhaler:  · Sends most of the medicine straight to your lungs. · Provides a measured dose of the medicine. · Can help keep your symptoms under control. · Can limit long-term damage to your lungs. · Can be safer than if you took a pill or liquid medicine. · Works just as well, is easier to carry, and is faster to use than a nebulizer machine. What devices can you use? Different types of devices can send inhaled medicine straight to your lungs. They include metered-dose inhalers and dry powder inhalers. You may need to use more than one type of device. How can you use it correctly? Each kind of inhaler is used differently. Make sure to save the 's instructions. Don't throw them away. Pay attention to the instructions for your specific inhaler. Look for directions on:  · Whether to prime or shake it. · Whether to use a spacer or mask. · Whether you have to load medicine into the inhaler. · How to use the inhaler to deliver the medicine.  This includes how to hold it, when to breathe, and how long to hold your breath. · How to know how many doses are left. · How to clean it. · How to store it. · When to throw it away. Why is it important to use it correctly? Correct use of an inhaler:  · Makes sure that the lungs get the full dose. This can mean fewer symptoms and better treatment. · Leads to fewer side effects. Sometimes people have side effects if the medicine hits the inside of the mouth instead of going into the lungs. · Saves money. Ask your doctor, nurse, pharmacist, or respiratory therapist to show you how to use the inhaler. They might ask you to show them how you use it, so they can help fix any problems. Follow-up care is a key part of your treatment and safety. Be sure to make and go to all appointments, and call your doctor if you are having problems. It's also a good idea to know your test results and keep a list of the medicines you take. Where can you learn more? Go to http://www.gray.com/  Enter I150 in the search box to learn more about \"Learning About Using Inhalers Correctly. \"  Current as of: July 6, 2021               Content Version: 13.0  © 7060-9043 Healthwise, [employer]. Care instructions adapted under license by [employer] (which disclaims liability or warranty for this information). If you have questions about a medical condition or this instruction, always ask your healthcare professional. [place] [address] any warranty or liability for your use of this information.

## 2021-10-01 ENCOUNTER — OFFICE VISIT (OUTPATIENT)
Dept: SURGERY | Age: 61
End: 2021-10-01
Payer: COMMERCIAL

## 2021-10-01 ENCOUNTER — HOSPITAL ENCOUNTER (OUTPATIENT)
Dept: GENERAL RADIOLOGY | Age: 61
Discharge: HOME | End: 2021-10-01
Payer: COMMERCIAL

## 2021-10-01 VITALS
WEIGHT: 199 LBS | HEIGHT: 70 IN | HEART RATE: 63 BPM | BODY MASS INDEX: 28.49 KG/M2 | DIASTOLIC BLOOD PRESSURE: 89 MMHG | SYSTOLIC BLOOD PRESSURE: 132 MMHG

## 2021-10-01 DIAGNOSIS — R06.2 WHEEZING ON EXPIRATION: ICD-10-CM

## 2021-10-01 DIAGNOSIS — Z86.010 HX OF COLONIC POLYPS: Primary | ICD-10-CM

## 2021-10-01 PROCEDURE — 71046 X-RAY EXAM CHEST 2 VIEWS: CPT

## 2021-10-01 PROCEDURE — 99213 OFFICE O/P EST LOW 20 MIN: CPT | Performed by: SURGERY

## 2021-10-01 NOTE — PROGRESS NOTES
[address] [facility] Surgery      Clinic Note - Follow up    [address] [place] [address] Dr returns for scheduled follow up today after colonoscopy that was performed back on September 21. He has been doing well since procedure. 2 polyps were removed at the time of colonoscopy with 1 of these being an adenomatous polyp. It was 3 mm in size. No other significant abnormalities were found. He states he noticed a little bit of pink-tinged fluid yesterday but otherwise has been doing well. Objective     Visit Vitals  /89   Pulse 63   Ht 5' 10\" (1.778 m)   Wt 199 lb (90.3 kg)   BMI 28.55 kg/m²         PE  GEN - Awake, alert, communicating appropriately. NAD      Assessment     Ric Garcia is a 64 y. o.yr old male status post colonoscopy with findings of a single 3 mm tubular adenoma at 25 cm. Plan     Would recommend a repeat colonoscopy in 5 years or sooner if he has any new problems.     Niesha Hendrickson MD    CC: Dr. Gaylin Essex

## 2021-10-01 NOTE — PATIENT INSTRUCTIONS
High-Fiber Diet: Care Instructions  Overview     A high-fiber diet may help you relieve constipation and feel less bloated. Your doctor and dietitian will help you make a high-fiber eating plan based on your personal needs. The plan will include the things you like to eat. It will also make sure that you get 25 to 35 grams of fiber a day. Before you make changes to the way you eat, be sure to talk with your doctor or dietitian. Follow-up care is a key part of your treatment and safety. Be sure to make and go to all appointments, and call your doctor if you are having problems. It's also a good idea to know your test results and keep a list of the medicines you take. How can you care for yourself at home? · You can increase how much fiber you get if you eat more of certain foods. These foods include:  ? Whole-grain breads and cereals. ? Fruits, such as pears, apples, and peaches. Eat the skins and peels if you can.  ? Vegetables, such as broccoli, cabbage, spinach, carrots, asparagus, and squash. ? Starchy vegetables. These include potatoes with skins, kidney beans, and lima beans. · Take a fiber supplement every day if your doctor recommends it. Examples are Benefiber, Citrucel, FiberCon, and Metamucil. Ask your doctor how much to take. · Drink plenty of fluids. If you have kidney, heart, or liver disease and have to limit fluids, talk with your doctor before you increase the amount of fluids you drink. Where can you learn more? Go to http://www.gray.com/  Enter Z403 in the search box to learn more about \"High-Fiber Diet: Care Instructions. \"  Current as of: December 17, 2020               Content Version: 13.0  © 2006-2021 Healthwise, Incorporated. Care instructions adapted under license by [employer] (which disclaims liability or warranty for this information).  If you have questions about a medical condition or this instruction, always ask your healthcare professional. [address] any warranty or liability for your use of this information.

## 2021-10-01 NOTE — PROGRESS NOTES
Spoke with patient after verifying Name, and , informed patient of Xray results per Dr. Valentina Black . Patient given an opportunity to ask questions, repeated information, and verbalized understanding.

## 2022-03-18 PROBLEM — D12.6 ADENOMATOUS POLYP OF COLON: Status: ACTIVE | Noted: 2021-09-21

## 2022-08-25 NOTE — LETTER
10/1/2021    Patient: Kian Avery   YOB: 1960   Date of Visit: 10/1/2021     Ruddy Gonzalez MD  [facility] [place] [address]  Via In Basket    Dear Ruddy Gonzalez MD,      Thank you for referring Mr. Stephanie Smith to [facility] for evaluation. My notes for this consultation are attached. If you have questions, please do not hesitate to call me. I look forward to following your patient along with you.       Sincerely,    Renee Sanders MD
negative

## 2022-09-21 ENCOUNTER — TELEPHONE (OUTPATIENT)
Dept: FAMILY MEDICINE CLINIC | Age: 62
End: 2022-09-21

## 2022-09-21 NOTE — TELEPHONE ENCOUNTER
Unable to reach patient regarding his request for PSA lab, mailbox is full. LOV 9/29/2021. Appointment needed.

## 2022-09-21 NOTE — TELEPHONE ENCOUNTER
----- Message from Otis Talamantes sent at 9/21/2022  2:51 PM EDT -----  Subject: Referral Request    Reason for referral request? Pty would like bloodwork for prostate to be   completed. Please notify pt when orders are put in his chart. Provider patient wants to be referred to(if known):     Provider Phone Number(if known):     Additional Information for Provider?   ---------------------------------------------------------------------------  --------------  [address]    [phone]; OK to leave message on voicemail  ---------------------------------------------------------------------------  --------------

## 2022-09-29 NOTE — LETTER
8/25/2021    Patient: Argelia Fagan   YOB: 1960   Date of Visit: 8/25/2021     Lily Jimenez MD  [address] [facility] [address]  Via In Basket    Dear Lily Jimenez MD,      Thank you for referring Mr. Aida Vicente to [address] [place] [address] [facility] for evaluation. My notes for this consultation are attached. If you have questions, please do not hesitate to call me. I look forward to following your patient along with you.       Sincerely,    Warden Efra MD 08:00

## 2022-10-04 ENCOUNTER — OFFICE VISIT (OUTPATIENT)
Dept: FAMILY MEDICINE CLINIC | Age: 62
End: 2022-10-04
Payer: COMMERCIAL

## 2022-10-04 VITALS
TEMPERATURE: 97.3 F | RESPIRATION RATE: 18 BRPM | HEIGHT: 70 IN | WEIGHT: 196.25 LBS | SYSTOLIC BLOOD PRESSURE: 106 MMHG | BODY MASS INDEX: 28.1 KG/M2 | HEART RATE: 76 BPM | DIASTOLIC BLOOD PRESSURE: 70 MMHG | OXYGEN SATURATION: 97 %

## 2022-10-04 DIAGNOSIS — N40.1 BPH WITH OBSTRUCTION/LOWER URINARY TRACT SYMPTOMS: Primary | ICD-10-CM

## 2022-10-04 DIAGNOSIS — N13.8 BPH WITH OBSTRUCTION/LOWER URINARY TRACT SYMPTOMS: Primary | ICD-10-CM

## 2022-10-04 DIAGNOSIS — Z23 ENCOUNTER FOR IMMUNIZATION: ICD-10-CM

## 2022-10-04 PROBLEM — Q15.9: Status: ACTIVE | Noted: 2022-10-04

## 2022-10-04 PROBLEM — H40.53X1: Status: ACTIVE | Noted: 2022-10-04

## 2022-10-04 PROCEDURE — 36415 COLL VENOUS BLD VENIPUNCTURE: CPT | Performed by: FAMILY MEDICINE

## 2022-10-04 PROCEDURE — 90686 IIV4 VACC NO PRSV 0.5 ML IM: CPT | Performed by: FAMILY MEDICINE

## 2022-10-04 PROCEDURE — 99213 OFFICE O/P EST LOW 20 MIN: CPT | Performed by: FAMILY MEDICINE

## 2022-10-04 NOTE — PROGRESS NOTES
1. \"Have you been to the ER, urgent care clinic since your last visit? Hospitalized since your last visit? \" No    2. \"Have you seen or consulted any other health care providers outside of the [address] [place] [address] since your last visit? \"  3/8/22 [facility]      3. For patients aged 39-70: Has the patient had a colonoscopy / FIT/ Cologuard? Yes - no Care Gap present      If the patient is female:    4. For patients aged 41-77: Has the patient had a mammogram within the past 2 years? NA - based on age or sex      11. For patients aged 21-65: Has the patient had a pap smear? NA - based on age or sex    10/4/2022      Chief Complaint   Patient presents with    Complete Physical         History of Present Illness:        Cinthia Perdomo is a 63 y.o. male returns for PSA. Feeling well, had colonoscopy with polyp in spring. Taking PPI regularly, not needing albuterol. Nocturia x 1-2. No Known Allergies    Current Outpatient Medications   Medication Sig    albuterol (PROVENTIL HFA, VENTOLIN HFA, PROAIR HFA) 90 mcg/actuation inhaler Take 2 Puffs by inhalation every four (4) hours as needed for Wheezing. inhalational spacing device 1 Each by Does Not Apply route as needed for Wheezing. Omeprazole delayed release (PRILOSEC D/R) 20 mg tablet Take 20 mg by mouth daily. latanoprost (XALATAN) 0.005 % ophthalmic solution      No current facility-administered medications for this visit. Physical Examination:    Visit Vitals  /70 (BP 1 Location: Left upper arm, BP Patient Position: Sitting, BP Cuff Size: Adult)   Pulse 76   Temp 97.3 °F (36.3 °C) (Oral)   Resp 18   Ht 5' 10\" (1.778 m)   Wt 196 lb 4 oz (89 kg)   SpO2 97%   BMI 28.16 kg/m²      General:  Alert, cooperative, no distress. HEENT:  Normocephalic, without obvious abnormality, atraumatic. Conjunctivae/corneas clear. Pupils equal, round, reactive to light. Extraocular movements intact. TMs and external canals normal bilaterally.  Nasal mucosa and oropharynx clear. Lungs: Clear to auscultation bilaterally. Chest wall:  No tenderness or deformity. Heart:  Regular rate and rhythm, S1, S2 normal, no murmur, click, rub, or gallop. Abdomen:   Soft, non-tender. Bowel sounds normal. No masses. No organomegaly. Extremities: Extremities normal, atraumatic, no cyanosis or edema. Pulses: 2+ and symmetric all extremities. Skin: Skin color, texture, turgor normal. No rashes or lesions. Lymph nodes: Cervical, supraclavicular, and axillary nodes normal.   Neurologic: CNII-XII intact. Normal strength, sensation, and reflexes throughout. ASSESSMENT AND PLAN    1. BPH with obstruction/lower urinary tract symptoms    - PSA W/ REFLX FREE PSA; Future    2. Encounter for immunization    - INFLUENZA, FLUARIX, FLULAVAL, FLUZONE (AGE 6 MO+), AFLURIA(AGE 3Y+) IM, PF, 0.5 ML      3. Health Maintenance    Will get COVID booster. Lipid panel low risk a year ago.        Orders Placed This Encounter    Influenza, FLUAD, (age 72 y+), IM, PF, 0.5 mL    PSA W/ REFLX FREE PSA     Standing Status:   Future     Standing Expiration Date:   10/4/2023           Jossie Jeffers MD

## 2022-10-06 LAB
PSA SERPL-MCNC: 1 NG/ML (ref 0–4)
REFLEX CRITERIA: NORMAL

## 2022-11-28 ENCOUNTER — OFFICE VISIT (OUTPATIENT)
Dept: FAMILY MEDICINE CLINIC | Age: 62
End: 2022-11-28
Payer: COMMERCIAL

## 2022-11-28 VITALS
WEIGHT: 199 LBS | RESPIRATION RATE: 16 BRPM | HEIGHT: 70 IN | SYSTOLIC BLOOD PRESSURE: 132 MMHG | DIASTOLIC BLOOD PRESSURE: 86 MMHG | OXYGEN SATURATION: 95 % | TEMPERATURE: 97.5 F | HEART RATE: 70 BPM | BODY MASS INDEX: 28.49 KG/M2

## 2022-11-28 DIAGNOSIS — M75.42 IMPINGEMENT SYNDROME OF LEFT SHOULDER: Primary | ICD-10-CM

## 2022-11-28 PROCEDURE — 20610 DRAIN/INJ JOINT/BURSA W/O US: CPT | Performed by: FAMILY MEDICINE

## 2022-11-28 RX ORDER — TRIAMCINOLONE ACETONIDE 40 MG/ML
40 INJECTION, SUSPENSION INTRA-ARTICULAR; INTRAMUSCULAR ONCE
Status: COMPLETED | OUTPATIENT
Start: 2022-11-28 | End: 2022-11-28

## 2022-11-28 RX ADMIN — TRIAMCINOLONE ACETONIDE 40 MG: 40 INJECTION, SUSPENSION INTRA-ARTICULAR; INTRAMUSCULAR at 14:59

## 2022-11-28 NOTE — PROGRESS NOTES
11/28/2022      Chief Complaint   Patient presents with    Shoulder Injury     Left     Shoulder Pain     Left         History of Present Illness:        Tu Corea is a 63 y.o. male with severe L shoulder pain with abduction after chopping and splitting wood last week. Some similar sxs on R, these have improved. No Known Allergies    Current Outpatient Medications   Medication Sig    Omeprazole delayed release (PRILOSEC D/R) 20 mg tablet Take 20 mg by mouth daily. latanoprost (XALATAN) 0.005 % ophthalmic solution     albuterol (PROVENTIL HFA, VENTOLIN HFA, PROAIR HFA) 90 mcg/actuation inhaler Take 2 Puffs by inhalation every four (4) hours as needed for Wheezing. (Patient not taking: Reported on 11/28/2022)    inhalational spacing device 1 Each by Does Not Apply route as needed for Wheezing. (Patient not taking: Reported on 11/28/2022)     Current Facility-Administered Medications   Medication    triamcinolone acetonide (KENALOG-40) 40 mg/mL injection 40 mg           Physical Examination:    Visit Vitals  /86 (BP 1 Location: Left upper arm, BP Patient Position: Sitting, BP Cuff Size: Adult)   Pulse 70   Temp 97.5 °F (36.4 °C) (Oral)   Resp 16   Ht 5' 10\" (1.778 m)   Wt 199 lb (90.3 kg)   SpO2 95%   BMI 28.55 kg/m²      General:  Alert, cooperative, no distress. HEENT:  Normocephalic, without obvious abnormality, atraumatic. Conjunctivae/corneas clear. Pupils equal, round, reactive to light. Extraocular movements intact. TMs and external canals normal bilaterally. Nasal mucosa and oropharynx clear. Lungs: Clear to auscultation bilaterally. Chest wall:  No tenderness or deformity. Heart:  Regular rate and rhythm, S1, S2 normal, no murmur, click, rub, or gallop. Abdomen:   Soft, non-tender. Bowel sounds normal. No masses. No organomegaly. Extremities: Extremities normal, atraumatic, no cyanosis or edema. L shoulder tender at biceps insertion and pain on passive abduction at 45 degrees.  Pain with internal rotation   Pulses: 2+ and symmetric all extremities. Skin: Skin color, texture, turgor normal. No rashes or lesions. Lymph nodes: Cervical, supraclavicular, and axillary nodes normal.   Neurologic: CNII-XII intact. Normal strength, sensation, and reflexes throughout. ASSESSMENT AND PLAN    1.  Impingement syndrome of left shoulder  Fol informed consent and sterile prep, L shoulder injected from posterior approach  - triamcinolone acetonide (KENALOG-40) 40 mg/mL injection 40 mg  - [place] DRAIN/INJECT LARGE JOINT/BURSA              Orders Placed This Encounter    triamcinolone acetonide (KENALOG-40) 40 mg/mL injection 40 mg           Aydin Upton MD

## 2022-11-28 NOTE — PROGRESS NOTES
1. \"Have you been to the ER, urgent care clinic since your last visit? Hospitalized since your last visit? \" No    2. \"Have you seen or consulted any other health care providers outside of the [address] [place] [address] since your last visit? \" No     3. For patients aged 39-70: Has the patient had a colonoscopy / FIT/ Cologuard? Yes - no Care Gap present      If the patient is female:    4. For patients aged 41-77: Has the patient had a mammogram within the past 2 years? NA - based on age or sex      11. For patients aged 21-65: Has the patient had a pap smear? NA - based on age or sex      [facility] NOTE        Chart reviewed for the following:   Dolan Habermann, LPN, have reviewed the History, Physical and updated the Allergic reactions for [address] performed immediately prior to start of procedure:   Dolan Habermann, LPN, have performed the following reviews on Randy Logans prior to the start of the procedure:            * Patient was identified by name and date of birth   * Agreement on procedure being performed was verified  * Risks and Benefits explained to the patient by Serg Dixon MD  * Procedure site verified and marked as necessary  * Patient was positioned for comfort  * Consent was signed and verified     Time: 2:50pm      Date of procedure: 11/28/2022    Procedure performed by:  Serg Dixon MD    Provider assisted by: Donald Barillas LPN    Patient assisted by: patient    Pre Procedural Pain Scale: 10    Procedure start time:  2:56 pm    Procedure end time:  2:57pm    How tolerated by patient: tolerated the procedure well with no complications    Post Procedural Pain Scale: 10 - Hurts Worst    Comments: Left Shoulder Injection     Post op instructions and patient education reviewed. Patient states understanding.     Copy of discharge instructions given to patient in AVS.

## 2022-12-12 ENCOUNTER — HOSPITAL ENCOUNTER (OUTPATIENT)
Dept: GENERAL RADIOLOGY | Age: 62
Discharge: HOME | End: 2022-12-12
Payer: COMMERCIAL

## 2022-12-12 ENCOUNTER — OFFICE VISIT (OUTPATIENT)
Dept: FAMILY MEDICINE CLINIC | Age: 62
End: 2022-12-12
Payer: COMMERCIAL

## 2022-12-12 VITALS
RESPIRATION RATE: 16 BRPM | DIASTOLIC BLOOD PRESSURE: 80 MMHG | WEIGHT: 197.5 LBS | OXYGEN SATURATION: 93 % | TEMPERATURE: 97.3 F | BODY MASS INDEX: 28.27 KG/M2 | HEIGHT: 70 IN | SYSTOLIC BLOOD PRESSURE: 136 MMHG | HEART RATE: 66 BPM

## 2022-12-12 DIAGNOSIS — M25.551 ACUTE RIGHT HIP PAIN: ICD-10-CM

## 2022-12-12 DIAGNOSIS — M25.551 ACUTE RIGHT HIP PAIN: Primary | ICD-10-CM

## 2022-12-12 LAB — ERYTHROCYTE [SEDIMENTATION RATE] IN BLOOD: 45 MM/HR (ref 0–20)

## 2022-12-12 PROCEDURE — 73502 X-RAY EXAM HIP UNI 2-3 VIEWS: CPT

## 2022-12-12 PROCEDURE — 99214 OFFICE O/P EST MOD 30 MIN: CPT | Performed by: FAMILY MEDICINE

## 2022-12-12 PROCEDURE — 36415 COLL VENOUS BLD VENIPUNCTURE: CPT | Performed by: FAMILY MEDICINE

## 2022-12-12 RX ORDER — MELOXICAM 15 MG/1
15 TABLET ORAL DAILY
Qty: 30 TABLET | Refills: 1 | Status: SHIPPED | OUTPATIENT
Start: 2022-12-12

## 2022-12-12 NOTE — PROGRESS NOTES
1. \"Have you been to the ER, urgent care clinic since your last visit? Hospitalized since your last visit? \" No    2. \"Have you seen or consulted any other health care providers outside of the [address] [place] [address] since your last visit? \" No     3. For patients aged 39-70: Has the patient had a colonoscopy / FIT/ Cologuard? Yes - no Care Gap present      If the patient is female:    4. For patients aged 41-77: Has the patient had a mammogram within the past 2 years? NA - based on age or sex      11. For patients aged 21-65: Has the patient had a pap smear? NA - based on age or sex    12/12/2022      Chief Complaint   Patient presents with    Leg Pain     Right   x   1 day          History of Present Illness:        Chelita Fine is a 63 y.o. male woke up this am with severe R hip pain. Unable to bear any weight. Had similar symptoms yesterday but milder on the left which resolved. No fever or systemic sxs. Denies any current or past IV drug use. Pain is worst medially and with internal rotation. No injury or change in activity in last three days. No rash. No Known Allergies    Current Outpatient Medications   Medication Sig    meloxicam (MOBIC) 15 mg tablet Take 1 Tablet by mouth daily. Omeprazole delayed release (PRILOSEC D/R) 20 mg tablet Take 20 mg by mouth daily. latanoprost (XALATAN) 0.005 % ophthalmic solution     albuterol (PROVENTIL HFA, VENTOLIN HFA, PROAIR HFA) 90 mcg/actuation inhaler Take 2 Puffs by inhalation every four (4) hours as needed for Wheezing. (Patient not taking: No sig reported)    inhalational spacing device 1 Each by Does Not Apply route as needed for Wheezing. (Patient not taking: No sig reported)     No current facility-administered medications for this visit.            Physical Examination:    Visit Vitals  /80 (BP 1 Location: Left upper arm, BP Patient Position: Sitting, BP Cuff Size: Adult)   Pulse 66   Temp 97.3 °F (36.3 °C) (Oral)   Resp 16   Ht 5' 10\" (1.778 m) Wt 197 lb 8 oz (89.6 kg)   SpO2 93%   BMI 28.34 kg/m²      General:  Alert, cooperative, no distress. HEENT:  Normocephalic, without obvious abnormality, atraumatic. Conjunctivae/corneas clear. Pupils equal, round, reactive to light. Extraocular movements intact. TMs and external canals normal bilaterally. Nasal mucosa and oropharynx clear. Lungs: Clear to auscultation bilaterally. Chest wall:  No tenderness or deformity. Heart:  Regular rate and rhythm, S1, S2 normal, no murmur, click, rub, or gallop. Abdomen:   Soft, non-tender. Bowel sounds normal. No masses. No organomegaly. Extremities: Extremities normal, atraumatic, no cyanosis or edema. Moves with difficulty to exam table. Will not bear weight on L leg. No obvious discomfort with passive elevation of extended leg. Guarding and pain with flexion and internal and external rotation of R hip   Pulses: 2+ and symmetric all extremities. Skin: Skin color, texture, turgor normal. No rashes or lesions. Lymph nodes: Cervical, supraclavicular, and axillary nodes normal.   Neurologic: CNII-XII intact. Normal strength, sensation, and reflexes throughout. ASSESSMENT AND PLAN    1. Acute right hip pain  Rule out acute infection, fracture or inflammatory arthritis  - XR HIP RT W OR WO PELV 2-3 VWS; Future  - SED RATE (ESR); Future  - C REACTIVE PROTEIN, QT; Future  - CBC WITH AUTOMATED DIFF; Future  - METABOLIC PANEL, COMPREHENSIVE; Future  - meloxicam (MOBIC) 15 mg tablet; Take 1 Tablet by mouth daily. Dispense: 30 Tablet; Refill: 1  - URIC ACID; Future            Orders Placed This Encounter    XR HIP RT W OR WO PELV 2-3 VWS     Standing Status:   Future     Standing Expiration Date:   1/12/2024     Order Specific Question:   Which facility to perform procedure?      Answer:   [facility]    SED RATE (ESR)     Standing Status:   Future     Standing Expiration Date:   12/12/2023    C REACTIVE PROTEIN, QT     Standing Status:   Future     Standing Expiration Date:   12/12/2023    CBC WITH AUTOMATED DIFF     Standing Status:   Future     Standing Expiration Date:   96/69/5395    METABOLIC PANEL, COMPREHENSIVE     Standing Status:   Future     Standing Expiration Date:   12/12/2023    URIC ACID     Standing Status:   Future     Standing Expiration Date:   12/12/2023    meloxicam (MOBIC) 15 mg tablet     Sig: Take 1 Tablet by mouth daily.      Dispense:  30 Tablet     Refill:  1           Larissa Hartman MD

## 2022-12-12 NOTE — PROGRESS NOTES
Spoke with patient after verifying Name, and , informed patient of Xray results and recommendations per Dr. Jose David Kan . Patient given an opportunity to ask questions, repeated information, and verbalized understanding.

## 2022-12-13 LAB
ALBUMIN SERPL-MCNC: 3.5 G/DL (ref 3.5–5)
ALBUMIN/GLOB SERPL: 0.8 {RATIO} (ref 1.1–2.2)
ALP SERPL-CCNC: 182 U/L (ref 45–117)
ALT SERPL-CCNC: 35 U/L (ref 12–78)
ANION GAP SERPL CALC-SCNC: 5 MMOL/L (ref 5–15)
AST SERPL-CCNC: 21 U/L (ref 15–37)
BASOPHILS # BLD: 0.1 K/UL (ref 0–0.1)
BASOPHILS NFR BLD: 1 % (ref 0–1)
BILIRUB SERPL-MCNC: 0.6 MG/DL (ref 0.2–1)
BUN SERPL-MCNC: 17 MG/DL (ref 6–20)
BUN/CREAT SERPL: 18 (ref 12–20)
CALCIUM SERPL-MCNC: 8.8 MG/DL (ref 8.5–10.1)
CHLORIDE SERPL-SCNC: 105 MMOL/L (ref 97–108)
CO2 SERPL-SCNC: 28 MMOL/L (ref 21–32)
COMMENT, HOLDF: NORMAL
CREAT SERPL-MCNC: 0.96 MG/DL (ref 0.7–1.3)
CRP SERPL-MCNC: 3.43 MG/DL (ref 0–0.6)
DIFFERENTIAL METHOD BLD: NORMAL
EOSINOPHIL # BLD: 0.1 K/UL (ref 0–0.4)
EOSINOPHIL NFR BLD: 1 % (ref 0–7)
ERYTHROCYTE [DISTWIDTH] IN BLOOD BY AUTOMATED COUNT: 12.6 % (ref 11.5–14.5)
GLOBULIN SER CALC-MCNC: 4.4 G/DL (ref 2–4)
GLUCOSE SERPL-MCNC: 89 MG/DL (ref 65–100)
HCT VFR BLD AUTO: 40.4 % (ref 36.6–50.3)
HGB BLD-MCNC: 13 G/DL (ref 12.1–17)
IMM GRANULOCYTES # BLD AUTO: 0 K/UL (ref 0–0.04)
IMM GRANULOCYTES NFR BLD AUTO: 0 % (ref 0–0.5)
LYMPHOCYTES # BLD: 2.1 K/UL (ref 0.8–3.5)
LYMPHOCYTES NFR BLD: 23 % (ref 12–49)
MCH RBC QN AUTO: 29.1 PG (ref 26–34)
MCHC RBC AUTO-ENTMCNC: 32.2 G/DL (ref 30–36.5)
MCV RBC AUTO: 90.4 FL (ref 80–99)
MONOCYTES # BLD: 0.8 K/UL (ref 0–1)
MONOCYTES NFR BLD: 9 % (ref 5–13)
NEUTS SEG # BLD: 5.9 K/UL (ref 1.8–8)
NEUTS SEG NFR BLD: 66 % (ref 32–75)
NRBC # BLD: 0 K/UL (ref 0–0.01)
NRBC BLD-RTO: 0 PER 100 WBC
PLATELET # BLD AUTO: 258 K/UL (ref 150–400)
PMV BLD AUTO: 10.2 FL (ref 8.9–12.9)
POTASSIUM SERPL-SCNC: 4.3 MMOL/L (ref 3.5–5.1)
PROT SERPL-MCNC: 7.9 G/DL (ref 6.4–8.2)
RBC # BLD AUTO: 4.47 M/UL (ref 4.1–5.7)
SAMPLES BEING HELD,HOLD: NORMAL
SODIUM SERPL-SCNC: 138 MMOL/L (ref 136–145)
URATE SERPL-MCNC: 3.6 MG/DL (ref 3.5–7.2)
WBC # BLD AUTO: 9.1 K/UL (ref 4.1–11.1)

## 2022-12-13 NOTE — PROGRESS NOTES
Spoke to patient after verifying two identifiers, informed per Dr Daigle Heading a urine sample was needed to test for infection. Acknowledged understanding, will be by this week.

## 2022-12-13 NOTE — PROGRESS NOTES
Add on labs for Rheumassure and FERNANDA by [facility], Ql faxed to [facility]. Ok confirmation received.

## 2022-12-14 LAB — ANA SER QL: NEGATIVE

## 2022-12-17 LAB
C TRACH RRNA SPEC QL NAA+PROBE: NEGATIVE
N GONORRHOEA RRNA SPEC QL NAA+PROBE: NEGATIVE
SPECIMEN SOURCE: NORMAL

## 2022-12-18 LAB — RHEUMATOID FACT SERPL-ACNC: 199.4 UNITS/ML

## 2022-12-19 NOTE — PROGRESS NOTES
Spoke to patient, serology + for RA. Will call in steroid taper and he will follow up here next week to discuss further management.   ismael

## 2022-12-21 NOTE — PROGRESS NOTES
I have called and spoke to this patient. I have transferred the call to the PSR's to make him an apt to see Dr Soha Elias next week to go over his lab results.

## 2022-12-28 ENCOUNTER — OFFICE VISIT (OUTPATIENT)
Dept: FAMILY MEDICINE CLINIC | Age: 62
End: 2022-12-28
Payer: COMMERCIAL

## 2022-12-28 VITALS
WEIGHT: 199.38 LBS | OXYGEN SATURATION: 97 % | RESPIRATION RATE: 16 BRPM | DIASTOLIC BLOOD PRESSURE: 80 MMHG | BODY MASS INDEX: 28.54 KG/M2 | HEART RATE: 70 BPM | TEMPERATURE: 97.3 F | HEIGHT: 70 IN | SYSTOLIC BLOOD PRESSURE: 118 MMHG

## 2022-12-28 DIAGNOSIS — M05.752: Primary | ICD-10-CM

## 2022-12-28 DIAGNOSIS — M05.751: Primary | ICD-10-CM

## 2022-12-28 PROCEDURE — 36415 COLL VENOUS BLD VENIPUNCTURE: CPT | Performed by: FAMILY MEDICINE

## 2022-12-28 PROCEDURE — 99214 OFFICE O/P EST MOD 30 MIN: CPT | Performed by: FAMILY MEDICINE

## 2022-12-28 RX ORDER — PREDNISONE 10 MG/1
10 TABLET ORAL
Qty: 30 TABLET | Refills: 1 | Status: SHIPPED | OUTPATIENT
Start: 2022-12-28

## 2022-12-29 LAB — HCV AB SERPL QL IA: NONREACTIVE

## 2023-01-31 ENCOUNTER — OFFICE VISIT (OUTPATIENT)
Dept: FAMILY MEDICINE CLINIC | Age: 63
End: 2023-01-31
Payer: COMMERCIAL

## 2023-01-31 VITALS
RESPIRATION RATE: 16 BRPM | TEMPERATURE: 96.9 F | HEART RATE: 60 BPM | BODY MASS INDEX: 28.77 KG/M2 | WEIGHT: 201 LBS | SYSTOLIC BLOOD PRESSURE: 137 MMHG | OXYGEN SATURATION: 96 % | DIASTOLIC BLOOD PRESSURE: 83 MMHG | HEIGHT: 70 IN

## 2023-01-31 DIAGNOSIS — M05.751: Primary | ICD-10-CM

## 2023-01-31 DIAGNOSIS — R76.8 HEPATITIS B SURFACE ANTIGEN POSITIVE: ICD-10-CM

## 2023-01-31 DIAGNOSIS — M05.752: Primary | ICD-10-CM

## 2023-01-31 PROCEDURE — 99214 OFFICE O/P EST MOD 30 MIN: CPT | Performed by: FAMILY MEDICINE

## 2023-01-31 PROCEDURE — 36415 COLL VENOUS BLD VENIPUNCTURE: CPT | Performed by: FAMILY MEDICINE

## 2023-01-31 RX ORDER — PREDNISONE 5 MG/1
5 TABLET ORAL DAILY
Qty: 30 TABLET | Refills: 1 | Status: SHIPPED | OUTPATIENT
Start: 2023-01-31

## 2023-01-31 NOTE — PROGRESS NOTES
1/31/2023      Chief Complaint   Patient presents with    Arthritis     4 week follow up     Shoulder Pain     Right         History of Present Illness:        Jorden Homans is a 63 y.o. male doing fairly well on 10 mg of prednisone. R shoulder was bothering him yesterday, knees intermittently, but hips are doing well. Good response to L shoulder injection. Spoke to Dr. Brian Lua office, they have not made an appointment, but have told him it would probably be June. No Known Allergies    Current Outpatient Medications   Medication Sig    predniSONE (DELTASONE) 5 mg tablet Take 1 Tablet by mouth daily. Omeprazole delayed release (PRILOSEC D/R) 20 mg tablet Take 20 mg by mouth daily. latanoprost (XALATAN) 0.005 % ophthalmic solution     albuterol (PROVENTIL HFA, VENTOLIN HFA, PROAIR HFA) 90 mcg/actuation inhaler Take 2 Puffs by inhalation every four (4) hours as needed for Wheezing. (Patient not taking: No sig reported)    inhalational spacing device 1 Each by Does Not Apply route as needed for Wheezing. (Patient not taking: No sig reported)     No current facility-administered medications for this visit. Physical Examination:    Visit Vitals  /83 (BP 1 Location: Left upper arm, BP Patient Position: Sitting, BP Cuff Size: Adult)   Pulse 60   Temp 96.9 °F (36.1 °C) (Oral)   Resp 16   Ht 5' 10\" (1.778 m)   Wt 201 lb (91.2 kg)   SpO2 96%   BMI 28.84 kg/m²      General:  Alert, cooperative, no distress. HEENT:  Normocephalic, without obvious abnormality, atraumatic. Conjunctivae/corneas clear. Pupils equal, round, reactive to light. Extraocular movements intact. TMs and external canals normal bilaterally. Nasal mucosa and oropharynx clear. Lungs: Clear to auscultation bilaterally. Chest wall:  No tenderness or deformity. Heart:  Regular rate and rhythm, S1, S2 normal, no murmur, click, rub, or gallop. Abdomen:   Soft, non-tender. Bowel sounds normal. No masses. No organomegaly. Extremities: Extremities normal, atraumatic, no cyanosis or edema. Good ROM at shoulders. No obvious synovitis of hands, wrists or large joints. Pulses: 2+ and symmetric all extremities. Skin: Skin color, texture, turgor normal. No rashes or lesions. Lymph nodes: Cervical, supraclavicular, and axillary nodes normal.   Neurologic: CNII-XII intact. Normal strength, sensation, and reflexes throughout. ASSESSMENT AND PLAN    1. Seropositive rheumatoid arthritis of both hips (HCC)  Reduce prednisone dose to 5 mg, complete preliminary bloodwork. Will begin MTX if we cannot get him in to rheumatologist sooner than June. - predniSONE (DELTASONE) 5 mg tablet; Take 1 Tablet by mouth daily. Dispense: 30 Tablet; Refill: 1  - SED RATE (ESR); Future  - HEP B SURFACE AG; Future  - HIV 1/2 AG/AB, 4TH GENERATION,W RFLX CONFIRM; Future            Orders Placed This Encounter    SED RATE (ESR)     Standing Status:   Future     Standing Expiration Date:   1/31/2024    HEP B SURFACE AG     Standing Status:   Future     Standing Expiration Date:   1/31/2024    HIV 1/2 AG/AB, 4TH GENERATION,W RFLX CONFIRM     Standing Status:   Future     Standing Expiration Date:   1/31/2024    predniSONE (DELTASONE) 5 mg tablet     Sig: Take 1 Tablet by mouth daily.      Dispense:  30 Tablet     Refill:  1     RTC 1 month      More Watson MD

## 2023-02-01 ENCOUNTER — TELEPHONE (OUTPATIENT)
Dept: FAMILY MEDICINE CLINIC | Age: 63
End: 2023-02-01

## 2023-02-01 LAB
ERYTHROCYTE [SEDIMENTATION RATE] IN BLOOD: 53 MM/HR (ref 0–20)
HBV SURFACE AG SER QL: <0.1 INDEX
HBV SURFACE AG SER QL: NEGATIVE
HBV SURFACE AG SERPL QL CFM: NORMAL
HIV 1+2 AB+HIV1 P24 AG SERPL QL IA: NONREACTIVE
HIV12 RESULT COMMENT, HHIVC: NORMAL

## 2023-02-01 NOTE — TELEPHONE ENCOUNTER
I have received a call from August at [facility] lab. She is calling with a corrected results for the patient's Hep B Surface Antigen. The corrected result is Non-Reactive. She tells me that this will be corrected in the patient's chart.

## 2023-02-21 ENCOUNTER — OFFICE VISIT (OUTPATIENT)
Dept: FAMILY MEDICINE CLINIC | Age: 63
End: 2023-02-21
Payer: COMMERCIAL

## 2023-02-21 VITALS
RESPIRATION RATE: 16 BRPM | TEMPERATURE: 97 F | HEIGHT: 70 IN | HEART RATE: 86 BPM | BODY MASS INDEX: 28.35 KG/M2 | WEIGHT: 198 LBS | OXYGEN SATURATION: 97 % | DIASTOLIC BLOOD PRESSURE: 82 MMHG | SYSTOLIC BLOOD PRESSURE: 122 MMHG

## 2023-02-21 DIAGNOSIS — M05.751: ICD-10-CM

## 2023-02-21 DIAGNOSIS — M05.752: ICD-10-CM

## 2023-02-21 DIAGNOSIS — M25.511 RIGHT SHOULDER PAIN, UNSPECIFIED CHRONICITY: Primary | ICD-10-CM

## 2023-02-21 PROCEDURE — 99214 OFFICE O/P EST MOD 30 MIN: CPT | Performed by: FAMILY MEDICINE

## 2023-02-21 PROCEDURE — 20610 DRAIN/INJ JOINT/BURSA W/O US: CPT | Performed by: FAMILY MEDICINE

## 2023-02-21 RX ORDER — METHOTREXATE 2.5 MG/1
7.5 TABLET ORAL
Qty: 12 TABLET | Refills: 3 | Status: SHIPPED | OUTPATIENT
Start: 2023-02-26 | End: 2023-03-28

## 2023-02-21 RX ORDER — TRIAMCINOLONE ACETONIDE 40 MG/ML
40 INJECTION, SUSPENSION INTRA-ARTICULAR; INTRAMUSCULAR ONCE
Status: COMPLETED | OUTPATIENT
Start: 2023-02-21 | End: 2023-02-21

## 2023-02-21 RX ADMIN — TRIAMCINOLONE ACETONIDE 40 MG: 40 INJECTION, SUSPENSION INTRA-ARTICULAR; INTRAMUSCULAR at 17:20

## 2023-02-21 NOTE — PROGRESS NOTES
1. \"Have you been to the ER, urgent care clinic since your last visit? Hospitalized since your last visit? \" No    2. \"Have you seen or consulted any other health care providers outside of the [address] [place] [address] since your last visit? \" No     3. For patients aged 39-70: Has the patient had a colonoscopy / FIT/ Cologuard? Yes - no Care Gap present      If the patient is female:    4. For patients aged 41-77: Has the patient had a mammogram within the past 2 years? NA - based on age or sex      11. For patients aged 21-65: Has the patient had a pap smear? NA - based on age or sex    [facility] NOTE        Chart reviewed for the following:   Lorenzo Parker LPN, have reviewed the History, Physical and updated the Allergic reactions for [address] performed immediately prior to start of procedure:   Lorenzo Parker LPN, have performed the following reviews on [facility] prior to the start of the procedure:            * Patient was identified by name and date of birth   * Agreement on procedure being performed was verified  * Risks and Benefits explained to the patient by Jory Ayoub MD  * Procedure site verified and marked as necessary  * Patient was positioned for comfort  * Consent was signed and verified     Time: 5:10 pm       Date of procedure: 2/21/2023    Procedure performed by:  Jory Ayoub MD    Provider assisted by: Liv Patel LPN    Patient assisted by: self    Pre Procedural Pain Scale: 3     Procedure start time:  5:17 pm    Procedure end time:  5:18 pm    How tolerated by patient: tolerated the procedure well with no complications    Post Procedural Pain Scale:  3    Comments:  Right Shoulder Injection     Post op instructions and patient education reviewed. Patient states understanding.     Copy of discharge instructions given to patient in AVS.

## 2023-02-21 NOTE — PROGRESS NOTES
2/21/2023      Chief Complaint   Patient presents with    Shoulder Pain     Right          History of Present Illness:        Rubi Hills is a 63 y.o. male returning to follow up RA. R shoulder pain is bad enough he would like an injection. Good response on L at end of Nov. Current appointment with Dr. Jo De La Cruz rheumatology is 4/16. No major flare with decrease of prednisone from 10 mg to 5 mg. No Known Allergies    Current Outpatient Medications   Medication Sig    [START ON 2/26/2023] methotrexate (RHEUMATREX) 2.5 mg tablet Take 3 Tablets by mouth every Sunday for 30 days. predniSONE (DELTASONE) 5 mg tablet Take 1 Tablet by mouth daily. Omeprazole delayed release (PRILOSEC D/R) 20 mg tablet Take 20 mg by mouth daily. latanoprost (XALATAN) 0.005 % ophthalmic solution     albuterol (PROVENTIL HFA, VENTOLIN HFA, PROAIR HFA) 90 mcg/actuation inhaler Take 2 Puffs by inhalation every four (4) hours as needed for Wheezing. (Patient not taking: No sig reported)    inhalational spacing device 1 Each by Does Not Apply route as needed for Wheezing. (Patient not taking: No sig reported)     No current facility-administered medications for this visit. Physical Examination:    Visit Vitals  /82 (BP 1 Location: Left upper arm, BP Patient Position: Sitting, BP Cuff Size: Adult)   Pulse 86   Temp 97 °F (36.1 °C) (Oral)   Resp 16   Ht 5' 10\" (1.778 m)   Wt 198 lb (89.8 kg)   SpO2 97%   BMI 28.41 kg/m²      General:  Alert, cooperative, no distress. HEENT:  Normocephalic, without obvious abnormality, atraumatic. Conjunctivae/corneas clear. Pupils equal, round, reactive to light. Extraocular movements intact. TMs and external canals normal bilaterally. Nasal mucosa and oropharynx clear. Lungs: Clear to auscultation bilaterally. Chest wall:  No tenderness or deformity. Heart:  Regular rate and rhythm, S1, S2 normal, no murmur, click, rub, or gallop. Abdomen:   Soft, non-tender.  Bowel sounds normal. No masses. No organomegaly. Extremities: Extremities normal, atraumatic, no cyanosis or edema. Moderate synovitis of L wrist   Pulses: 2+ and symmetric all extremities. Skin: Skin color, texture, turgor normal. No rashes or lesions. Lymph nodes: Cervical, supraclavicular, and axillary nodes normal.   Neurologic: CNII-XII intact. Normal strength, sensation, and reflexes throughout. ASSESSMENT AND PLAN    1. Right shoulder pain, unspecified chronicity  Fol informed consent and sterile prep, R shoulder injected from posterolateral approach  - AR ARTHROCENTESIS ASPIR&/INJ MAJOR JT/BURSA W/O US  - triamcinolone acetonide (KENALOG-40) 40 mg/mL injection 40 mg    2. Seropositive rheumatoid arthritis of both hips ([address]  Begin initial dose of MTX. Recheck in 4 weeks. Patient information provided on risks and benefits of immunomodulatory meds. - methotrexate (RHEUMATREX) 2.5 mg tablet; Take 3 Tablets by mouth every Sunday for 30 days. Dispense: 12 Tablet; Refill: 3            Orders Placed This Encounter    20610 - DRAIN/INJECT LARGE JOINT/BURSA    triamcinolone acetonide (KENALOG-40) 40 mg/mL injection 40 mg    methotrexate (RHEUMATREX) 2.5 mg tablet     Sig: Take 3 Tablets by mouth every Sunday for 30 days.      Dispense:  12 Tablet     Refill:  3       RTC 4 weeks    Jaime Rivera MD

## 2023-03-14 ENCOUNTER — OFFICE VISIT (OUTPATIENT)
Dept: FAMILY MEDICINE CLINIC | Age: 63
End: 2023-03-14
Payer: COMMERCIAL

## 2023-03-14 VITALS
SYSTOLIC BLOOD PRESSURE: 137 MMHG | HEIGHT: 70 IN | OXYGEN SATURATION: 95 % | WEIGHT: 199 LBS | DIASTOLIC BLOOD PRESSURE: 81 MMHG | HEART RATE: 78 BPM | RESPIRATION RATE: 16 BRPM | TEMPERATURE: 96.9 F | BODY MASS INDEX: 28.49 KG/M2

## 2023-03-14 DIAGNOSIS — M05.751: Primary | ICD-10-CM

## 2023-03-14 DIAGNOSIS — M05.752: Primary | ICD-10-CM

## 2023-03-14 LAB — ERYTHROCYTE [SEDIMENTATION RATE] IN BLOOD: 43 MM/HR (ref 0–20)

## 2023-03-14 PROCEDURE — 99213 OFFICE O/P EST LOW 20 MIN: CPT | Performed by: FAMILY MEDICINE

## 2023-03-14 NOTE — PROGRESS NOTES
YES Answers must have Comments  1. \"Have you been to the ER, urgent care clinic since your last visit? Hospitalized since your last visit? \"    [] YES   [x] NO       2. Have you seen or consulted any other health care providers outside of [address] [place] [address] since your last visit?     [] YES   [x] NO       3. For patients aged 39-70: Have you had a colorectal cancer screening such as a colonoscopy/FIT/Cologuard? Nurse/CMA to request records if not in chart   [x] YES   [] NO   [] NA, based on age    If the patient is female:      4. For female patients aged 41-77: Stefani Moyer you had a mammogram in the last two years?  Nurse/CMA to request records if not in chart   [] YES   [] NO   [x] NA, based on age    11. For female patients aged 21-65: Stefani Moyer you had a pap smear?   Nurse/CMA to request records if not in chart   [] YES   [] NO  [x] NA, based on age    Food and Housing resources given at visit. 3/14/2023      Chief Complaint   Patient presents with    Arthritis     Rheumatoid - 4 week follow up     Neck Pain         History of Present Illness:        Romana Figures is a 63 y.o. male to follow up his RA. Has a stiff neck but no particular synovitis. Didn't start MTX till 3/7, so has only taken two doses. Sees rheumatology on 5/16. No Known Allergies    Current Outpatient Medications   Medication Sig    methotrexate (RHEUMATREX) 2.5 mg tablet Take 3 Tablets by mouth every Sunday for 30 days. predniSONE (DELTASONE) 5 mg tablet Take 1 Tablet by mouth daily. Omeprazole delayed release (PRILOSEC D/R) 20 mg tablet Take 20 mg by mouth daily. latanoprost (XALATAN) 0.005 % ophthalmic solution     albuterol (PROVENTIL HFA, VENTOLIN HFA, PROAIR HFA) 90 mcg/actuation inhaler Take 2 Puffs by inhalation every four (4) hours as needed for Wheezing. (Patient not taking: No sig reported)    inhalational spacing device 1 Each by Does Not Apply route as needed for Wheezing.  (Patient not taking: No sig reported)     No current facility-administered medications for this visit. Physical Examination:    Visit Vitals  /81 (BP 1 Location: Left upper arm, BP Patient Position: Sitting, BP Cuff Size: Adult)   Pulse 78   Temp 96.9 °F (36.1 °C) (Oral)   Resp 16   Ht 5' 10\" (1.778 m)   Wt 199 lb (90.3 kg)   SpO2 95%   BMI 28.55 kg/m²      General:  Alert, cooperative, no distress. HEENT:  Normocephalic, without obvious abnormality, atraumatic. Conjunctivae/corneas clear. Pupils equal, round, reactive to light. Extraocular movements intact. TMs and external canals normal bilaterally. Nasal mucosa and oropharynx clear. Lungs: Clear to auscultation bilaterally. Chest wall:  No tenderness or deformity. Heart:  Regular rate and rhythm, S1, S2 normal, no murmur, click, rub, or gallop. Abdomen:   Soft, non-tender. Bowel sounds normal. No masses. No organomegaly. Extremities: Extremities normal, atraumatic, no cyanosis or edema. Pulses: 2+ and symmetric all extremities. Skin: Skin color, texture, turgor normal. No rashes or lesions. Lymph nodes: Cervical, supraclavicular, and axillary nodes normal.   Neurologic: CNII-XII intact. Normal strength, sensation, and reflexes throughout. ASSESSMENT AND PLAN    1. Seropositive rheumatoid arthritis of both hips (HCC)  Cont MTX and low dose prednisone. Recheck in 4 weeks. - SED RATE (ESR);  Future            Orders Placed This Encounter    SED RATE (ESR)     Standing Status:   Future     Standing Expiration Date:   3/14/2024     RTC 4 weeks      Orlando Vásquez MD

## 2023-03-30 DIAGNOSIS — M05.751: ICD-10-CM

## 2023-03-30 DIAGNOSIS — M05.752: ICD-10-CM

## 2023-03-30 RX ORDER — PREDNISONE 5 MG/1
5 TABLET ORAL DAILY
Qty: 30 TABLET | Refills: 0 | Status: SHIPPED | OUTPATIENT
Start: 2023-03-30

## 2023-05-03 ENCOUNTER — OFFICE VISIT (OUTPATIENT)
Dept: FAMILY MEDICINE CLINIC | Age: 63
End: 2023-05-03
Payer: COMMERCIAL

## 2023-05-03 VITALS
OXYGEN SATURATION: 96 % | RESPIRATION RATE: 16 BRPM | DIASTOLIC BLOOD PRESSURE: 57 MMHG | HEART RATE: 73 BPM | BODY MASS INDEX: 28.35 KG/M2 | TEMPERATURE: 97.3 F | HEIGHT: 70 IN | WEIGHT: 198 LBS | SYSTOLIC BLOOD PRESSURE: 130 MMHG

## 2023-05-03 DIAGNOSIS — M05.751: Primary | ICD-10-CM

## 2023-05-03 DIAGNOSIS — M05.752: Primary | ICD-10-CM

## 2023-05-03 LAB — ERYTHROCYTE [SEDIMENTATION RATE] IN BLOOD: 45 MM/HR (ref 0–20)

## 2023-05-03 PROCEDURE — 36415 COLL VENOUS BLD VENIPUNCTURE: CPT | Performed by: FAMILY MEDICINE

## 2023-05-03 PROCEDURE — 99214 OFFICE O/P EST MOD 30 MIN: CPT | Performed by: FAMILY MEDICINE

## 2023-05-03 RX ORDER — METHOTREXATE 2.5 MG/1
15 TABLET ORAL
Qty: 24 TABLET | Refills: 2 | Status: SHIPPED | OUTPATIENT
Start: 2023-05-09 | End: 2023-05-05

## 2023-05-03 RX ORDER — PREDNISONE 5 MG/1
5 TABLET ORAL DAILY
Qty: 30 TABLET | Refills: 0 | Status: SHIPPED | OUTPATIENT
Start: 2023-05-03

## 2023-05-03 RX ORDER — METHOTREXATE 2.5 MG/1
7.5 TABLET ORAL
COMMUNITY
End: 2023-05-03 | Stop reason: SDUPTHER

## 2023-05-04 ENCOUNTER — TELEPHONE (OUTPATIENT)
Dept: FAMILY MEDICINE CLINIC | Age: 63
End: 2023-05-04

## 2023-05-04 LAB
ALBUMIN SERPL-MCNC: 3.2 G/DL (ref 3.5–5)
ALBUMIN/GLOB SERPL: 0.8 (ref 1.1–2.2)
ALP SERPL-CCNC: 169 U/L (ref 45–117)
ALT SERPL-CCNC: 26 U/L (ref 12–78)
ANION GAP SERPL CALC-SCNC: 3 MMOL/L (ref 5–15)
AST SERPL-CCNC: 21 U/L (ref 15–37)
BASOPHILS # BLD: 0 K/UL (ref 0–0.1)
BASOPHILS NFR BLD: 1 % (ref 0–1)
BILIRUB SERPL-MCNC: 0.3 MG/DL (ref 0.2–1)
BUN SERPL-MCNC: 16 MG/DL (ref 6–20)
BUN/CREAT SERPL: 21 (ref 12–20)
CALCIUM SERPL-MCNC: 8.6 MG/DL (ref 8.5–10.1)
CHLORIDE SERPL-SCNC: 110 MMOL/L (ref 97–108)
CO2 SERPL-SCNC: 25 MMOL/L (ref 21–32)
CREAT SERPL-MCNC: 0.76 MG/DL (ref 0.7–1.3)
CRP SERPL-MCNC: 2.81 MG/DL (ref 0–0.6)
DIFFERENTIAL METHOD BLD: NORMAL
EOSINOPHIL # BLD: 0.1 K/UL (ref 0–0.4)
EOSINOPHIL NFR BLD: 1 % (ref 0–7)
ERYTHROCYTE [DISTWIDTH] IN BLOOD BY AUTOMATED COUNT: 12.9 % (ref 11.5–14.5)
GLOBULIN SER CALC-MCNC: 3.8 G/DL (ref 2–4)
GLUCOSE SERPL-MCNC: 123 MG/DL (ref 65–100)
HCT VFR BLD AUTO: 39.2 % (ref 36.6–50.3)
HGB BLD-MCNC: 12.4 G/DL (ref 12.1–17)
IMM GRANULOCYTES # BLD AUTO: 0 K/UL (ref 0–0.04)
IMM GRANULOCYTES NFR BLD AUTO: 0 % (ref 0–0.5)
LYMPHOCYTES # BLD: 1.6 K/UL (ref 0.8–3.5)
LYMPHOCYTES NFR BLD: 24 % (ref 12–49)
MCH RBC QN AUTO: 30 PG (ref 26–34)
MCHC RBC AUTO-ENTMCNC: 31.6 G/DL (ref 30–36.5)
MCV RBC AUTO: 94.7 FL (ref 80–99)
MONOCYTES # BLD: 0.4 K/UL (ref 0–1)
MONOCYTES NFR BLD: 6 % (ref 5–13)
NEUTS SEG # BLD: 4.5 K/UL (ref 1.8–8)
NEUTS SEG NFR BLD: 68 % (ref 32–75)
NRBC # BLD: 0 K/UL (ref 0–0.01)
NRBC BLD-RTO: 0 PER 100 WBC
PLATELET # BLD AUTO: 227 K/UL (ref 150–400)
PMV BLD AUTO: 10 FL (ref 8.9–12.9)
POTASSIUM SERPL-SCNC: 3.9 MMOL/L (ref 3.5–5.1)
PROT SERPL-MCNC: 7 G/DL (ref 6.4–8.2)
RBC # BLD AUTO: 4.14 M/UL (ref 4.1–5.7)
SODIUM SERPL-SCNC: 138 MMOL/L (ref 136–145)
WBC # BLD AUTO: 6.5 K/UL (ref 4.1–11.1)

## 2023-05-05 RX ORDER — METHOTREXATE 2.5 MG/1
15 TABLET ORAL
Qty: 24 TABLET | Refills: 2 | Status: SHIPPED | OUTPATIENT
Start: 2023-05-09

## 2023-05-23 RX ORDER — ALBUTEROL SULFATE 90 UG/1
2 AEROSOL, METERED RESPIRATORY (INHALATION) EVERY 4 HOURS PRN
COMMUNITY
Start: 2021-09-29

## 2023-05-23 RX ORDER — LATANOPROST 50 UG/ML
SOLUTION/ DROPS OPHTHALMIC
COMMUNITY
Start: 2019-08-31

## 2023-05-23 RX ORDER — OMEPRAZOLE 20 MG/1
20 TABLET, DELAYED RELEASE ORAL DAILY
COMMUNITY

## 2023-05-23 RX ORDER — PREDNISONE 5 MG/1
5 TABLET ORAL DAILY
COMMUNITY
Start: 2023-05-03

## 2024-01-29 ENCOUNTER — TELEPHONE (OUTPATIENT)
Dept: FAMILY MEDICINE CLINIC | Age: 64
End: 2024-01-29

## 2024-01-29 NOTE — TELEPHONE ENCOUNTER
Care Transitions Initial Follow Up Call    Outreach made within 2 business days of discharge: Yes    Patient: Larry Carter Patient : 1960   MRN: [id number]  Reason for Admission: No discharge information exists for this patient.  Discharge Date:         Spoke with: Patient    Discharge department/facility: [facility] Interactive Patient Contact:  Was patient able to fill all prescriptions: No: patient says he did not get any RX's from the hospital.  Was patient instructed to bring all medications to the follow-up visit: Yes  Is patient taking all medications as directed in the discharge summary? Yes  Does patient understand their discharge instructions: Yes  Does patient have questions or concerns that need addressed prior to 7-14 day follow up office visit: no    Scheduled appointment with PCP within 7-14 days    Follow Up  Future Appointments   Date Time Provider Department Center   2024  4:20 PM Eligio Torres MD [facility] MAIN BS SHANIA Mendoza RN

## 2024-01-31 ENCOUNTER — OFFICE VISIT (OUTPATIENT)
Dept: FAMILY MEDICINE CLINIC | Age: 64
End: 2024-01-31
Payer: COMMERCIAL

## 2024-01-31 VITALS
TEMPERATURE: 96.9 F | SYSTOLIC BLOOD PRESSURE: 126 MMHG | HEART RATE: 85 BPM | WEIGHT: 200 LBS | RESPIRATION RATE: 16 BRPM | BODY MASS INDEX: 28.63 KG/M2 | HEIGHT: 70 IN | DIASTOLIC BLOOD PRESSURE: 69 MMHG | OXYGEN SATURATION: 97 %

## 2024-01-31 DIAGNOSIS — K80.51 CALCULUS OF BILE DUCT WITHOUT CHOLECYSTITIS WITH OBSTRUCTION: ICD-10-CM

## 2024-01-31 DIAGNOSIS — Z09 HOSPITAL DISCHARGE FOLLOW-UP: Primary | ICD-10-CM

## 2024-01-31 PROCEDURE — 99215 OFFICE O/P EST HI 40 MIN: CPT | Performed by: FAMILY MEDICINE

## 2024-01-31 PROCEDURE — 1111F DSCHRG MED/CURRENT MED MERGE: CPT | Performed by: FAMILY MEDICINE

## 2024-01-31 RX ORDER — FOLIC ACID 1 MG/1
2 TABLET ORAL DAILY
COMMUNITY
Start: 2023-12-21 | End: 2024-03-20

## 2024-01-31 RX ORDER — ADALIMUMAB 40MG/0.8ML
40 KIT SUBCUTANEOUS
COMMUNITY
Start: 2023-12-12

## 2024-01-31 ASSESSMENT — PATIENT HEALTH QUESTIONNAIRE - PHQ9
SUM OF ALL RESPONSES TO PHQ QUESTIONS 1-9: 0
SUM OF ALL RESPONSES TO PHQ QUESTIONS 1-9: 0
SUM OF ALL RESPONSES TO PHQ9 QUESTIONS 1 & 2: 0
SUM OF ALL RESPONSES TO PHQ QUESTIONS 1-9: 0
SUM OF ALL RESPONSES TO PHQ QUESTIONS 1-9: 0
2. FEELING DOWN, DEPRESSED OR HOPELESS: 0
1. LITTLE INTEREST OR PLEASURE IN DOING THINGS: 0

## 2024-01-31 NOTE — PROGRESS NOTES
Post-Discharge Transitional Care Management Progress Note      Larry Carter   YOB: 1960    Date of Office Visit:  1/31/2024  Date of Hospital Admission: 1/23/24  Date of Hospital Discharge: 1/25/24    Care management risk score Rising risk (score 2-5) and Complex Care (Scores >=6): No Risk Score On File     Non face to face  following discharge, date last encounter closed (first attempt may have been earlier): 01/29/2024 01/29/2024    Call initiated 2 business days of discharge: Yes    ASSESSMENT/PLAN:   Hospital discharge follow-up  -     MS DISCHARGE MEDS RECONCILED W/ CURRENT OUTPATIENT MED LIST    Medical Decision Making: high complexity  No follow-ups on file.         Subjective:   HPI:  Follow up of Hospital problems/diagnosis(es): Admitted to [facility] for ERCP for common duct stone. No post procedure complications, feeling well and back to work    Inpatient course: Discharge summary reviewed- see chart.    Interval history/Current status: still on 5 mg prednisone for RA. Sees rheum in May.    Patient Active Problem List   Diagnosis    Adenomatous polyp of colon    Glaucoma associated with chamber angle anomaly, mild stage, bilateral       Medications listed as ordered at the time of discharge from hospital     Medication List            Accurate as of January 31, 2024  4:24 PM. If you have any questions, ask your nurse or doctor.                CONTINUE taking these medications      albuterol sulfate  (90 Base) MCG/ACT inhaler  Commonly known as: PROVENTIL;VENTOLIN;PROAIR     folic acid 1 MG tablet  Commonly known as: FOLVITE     Humira 40 MG/0.8ML injection  Generic drug: adalimumab     latanoprost 0.005 % ophthalmic solution  Commonly known as: XALATAN     methotrexate 2.5 MG chemo tablet  Commonly known as: RHEUMATREX     omeprazole 20 MG tablet  Commonly known as: PRILOSEC OTC     predniSONE 5 MG tablet  Commonly known as: DELTASONE                Medications marked \"taking\" at

## 2024-01-31 NOTE — PROGRESS NOTES
\"Have you been to the ER, urgent care clinic since your last visit?  Hospitalized since your last visit?\"     Yes - 1/23/24 [facility] 1/25/24 - [facility]     1/22/24 [facility] ER - Abdominal Pain     “Have you seen or consulted any other health care providers outside of [facility] since your last visit?”    NO

## 2024-06-20 RX ORDER — ALBUTEROL SULFATE 90 UG/1
2 AEROSOL, METERED RESPIRATORY (INHALATION) EVERY 4 HOURS PRN
Qty: 18 G | Refills: 3 | Status: SHIPPED | OUTPATIENT
Start: 2024-06-20

## 2024-06-20 NOTE — TELEPHONE ENCOUNTER
Requested Prescriptions     Pending Prescriptions Disp Refills    albuterol sulfate HFA (PROVENTIL;VENTOLIN;PROAIR) 108 (90 Base) MCG/ACT inhaler [Pharmacy Med Name: ALBUTEROL SUL HFA 90 MCG INH] 18 g 0     Sig: Take 2 Puffs by inhalation every four (4) hours as needed for Wheezing.     Last seen:  1/31/24

## 2025-02-03 ENCOUNTER — HOSPITAL ENCOUNTER (OUTPATIENT)
Facility: HOSPITAL | Age: 65
Discharge: HOME | End: 2025-02-06
Payer: COMMERCIAL

## 2025-02-03 LAB
ALBUMIN SERPL-MCNC: 3.5 G/DL (ref 3.5–5)
ALBUMIN/GLOB SERPL: 0.8 (ref 1.1–2.2)
ALP SERPL-CCNC: 157 U/L (ref 45–117)
ALT SERPL-CCNC: 30 U/L (ref 12–78)
ANION GAP SERPL CALC-SCNC: 6 MMOL/L (ref 2–12)
AST SERPL-CCNC: 24 U/L (ref 15–37)
BASOPHILS # BLD: 0.06 K/UL (ref 0–0.1)
BASOPHILS NFR BLD: 1.3 % (ref 0–1)
BILIRUB SERPL-MCNC: 0.5 MG/DL (ref 0.2–1)
BUN SERPL-MCNC: 18 MG/DL (ref 6–20)
BUN/CREAT SERPL: 16 (ref 12–20)
CALCIUM SERPL-MCNC: 9.4 MG/DL (ref 8.5–10.1)
CHLORIDE SERPL-SCNC: 104 MMOL/L (ref 97–108)
CO2 SERPL-SCNC: 30 MMOL/L (ref 21–32)
CREAT SERPL-MCNC: 1.11 MG/DL (ref 0.7–1.3)
CRP SERPL-MCNC: 0.66 MG/DL (ref 0–0.3)
DIFFERENTIAL METHOD BLD: ABNORMAL
EOSINOPHIL # BLD: 0.24 K/UL (ref 0–0.4)
EOSINOPHIL NFR BLD: 5.2 % (ref 0–7)
ERYTHROCYTE [DISTWIDTH] IN BLOOD BY AUTOMATED COUNT: 12.9 % (ref 11.5–14.5)
ERYTHROCYTE [SEDIMENTATION RATE] IN BLOOD: 24 MM/HR (ref 0–20)
GLOBULIN SER CALC-MCNC: 4.5 G/DL (ref 2–4)
GLUCOSE SERPL-MCNC: 94 MG/DL (ref 65–100)
HCT VFR BLD AUTO: 41.8 % (ref 36.6–50.3)
HGB BLD-MCNC: 13.6 G/DL (ref 12.1–17)
IMM GRANULOCYTES # BLD AUTO: 0.02 K/UL (ref 0–0.04)
IMM GRANULOCYTES NFR BLD AUTO: 0.4 % (ref 0–0.5)
LYMPHOCYTES # BLD: 1.84 K/UL (ref 0.8–3.5)
LYMPHOCYTES NFR BLD: 40.1 % (ref 12–49)
MCH RBC QN AUTO: 30.4 PG (ref 26–34)
MCHC RBC AUTO-ENTMCNC: 32.5 G/DL (ref 30–36.5)
MCV RBC AUTO: 93.3 FL (ref 80–99)
MONOCYTES # BLD: 0.43 K/UL (ref 0–1)
MONOCYTES NFR BLD: 9.4 % (ref 5–13)
NEUTS SEG # BLD: 2 K/UL (ref 1.8–8)
NEUTS SEG NFR BLD: 43.6 % (ref 32–75)
NRBC # BLD: 0 K/UL (ref 0–0.01)
NRBC BLD-RTO: 0 PER 100 WBC
PLATELET # BLD AUTO: 203 K/UL (ref 150–400)
PMV BLD AUTO: 9.3 FL (ref 8.9–12.9)
POTASSIUM SERPL-SCNC: 4 MMOL/L (ref 3.5–5.1)
PROT SERPL-MCNC: 8 G/DL (ref 6.4–8.2)
RBC # BLD AUTO: 4.48 M/UL (ref 4.1–5.7)
SODIUM SERPL-SCNC: 140 MMOL/L (ref 136–145)
WBC # BLD AUTO: 4.6 K/UL (ref 4.1–11.1)

## 2025-02-03 PROCEDURE — 85652 RBC SED RATE AUTOMATED: CPT

## 2025-02-03 PROCEDURE — 85025 COMPLETE CBC W/AUTO DIFF WBC: CPT

## 2025-02-03 PROCEDURE — 36415 COLL VENOUS BLD VENIPUNCTURE: CPT

## 2025-02-03 PROCEDURE — 80053 COMPREHEN METABOLIC PANEL: CPT

## 2025-02-03 PROCEDURE — 86480 TB TEST CELL IMMUN MEASURE: CPT

## 2025-02-03 PROCEDURE — 86140 C-REACTIVE PROTEIN: CPT

## 2025-02-09 LAB
M TB IFN-G BLD-IMP: NEGATIVE
M TB IFN-G CD4+ T-CELLS BLD-ACNC: 0.06 IU/ML
M TBIFN-G CD4+ CD8+T-CELLS BLD-ACNC: 0.06 IU/ML
QUANTIFERON CRITERIA: NORMAL
QUANTIFERON MITOGEN VALUE: >10 IU/ML
QUANTIFERON NIL VALUE: 0.07 IU/ML

## 2025-05-21 ENCOUNTER — OFFICE VISIT (OUTPATIENT)
Dept: FAMILY MEDICINE CLINIC | Age: 65
End: 2025-05-21
Payer: COMMERCIAL

## 2025-05-21 VITALS
HEART RATE: 71 BPM | HEIGHT: 70 IN | TEMPERATURE: 97 F | SYSTOLIC BLOOD PRESSURE: 102 MMHG | BODY MASS INDEX: 26.25 KG/M2 | RESPIRATION RATE: 16 BRPM | OXYGEN SATURATION: 96 % | DIASTOLIC BLOOD PRESSURE: 69 MMHG | WEIGHT: 183.38 LBS

## 2025-05-21 DIAGNOSIS — N45.2 ORCHITIS OF LEFT TESTICLE: ICD-10-CM

## 2025-05-21 DIAGNOSIS — N50.812 PAIN IN LEFT TESTICLE: Primary | ICD-10-CM

## 2025-05-21 LAB
BILIRUBIN, URINE, POC: NORMAL
BLOOD URINE, POC: NEGATIVE
GLUCOSE URINE, POC: NEGATIVE
KETONES, URINE, POC: NEGATIVE
LEUKOCYTE ESTERASE, URINE, POC: NEGATIVE
NITRITE, URINE, POC: NEGATIVE
PH, URINE, POC: 5.5 (ref 4.6–8)
PROTEIN,URINE, POC: NORMAL
SPECIFIC GRAVITY, URINE, POC: 1.03 (ref 1–1.03)
URINALYSIS CLARITY, POC: CLEAR
URINALYSIS COLOR, POC: NORMAL
UROBILINOGEN, POC: NORMAL MG/DL

## 2025-05-21 PROCEDURE — 81002 URINALYSIS NONAUTO W/O SCOPE: CPT | Performed by: FAMILY MEDICINE

## 2025-05-21 PROCEDURE — 99213 OFFICE O/P EST LOW 20 MIN: CPT | Performed by: FAMILY MEDICINE

## 2025-05-21 RX ORDER — FOLIC ACID 1 MG/1
1 TABLET ORAL DAILY
COMMUNITY
Start: 2024-12-05

## 2025-05-21 RX ORDER — DOXYCYCLINE HYCLATE 100 MG
100 TABLET ORAL 2 TIMES DAILY
Qty: 20 TABLET | Refills: 0 | Status: SHIPPED | OUTPATIENT
Start: 2025-05-21 | End: 2025-05-31

## 2025-05-21 SDOH — ECONOMIC STABILITY: FOOD INSECURITY: WITHIN THE PAST 12 MONTHS, THE FOOD YOU BOUGHT JUST DIDN'T LAST AND YOU DIDN'T HAVE MONEY TO GET MORE.: NEVER TRUE

## 2025-05-21 SDOH — ECONOMIC STABILITY: FOOD INSECURITY: WITHIN THE PAST 12 MONTHS, YOU WORRIED THAT YOUR FOOD WOULD RUN OUT BEFORE YOU GOT MONEY TO BUY MORE.: NEVER TRUE

## 2025-05-21 ASSESSMENT — PATIENT HEALTH QUESTIONNAIRE - PHQ9
SUM OF ALL RESPONSES TO PHQ QUESTIONS 1-9: 2
SUM OF ALL RESPONSES TO PHQ QUESTIONS 1-9: 2
1. LITTLE INTEREST OR PLEASURE IN DOING THINGS: SEVERAL DAYS
SUM OF ALL RESPONSES TO PHQ QUESTIONS 1-9: 2
SUM OF ALL RESPONSES TO PHQ QUESTIONS 1-9: 2
2. FEELING DOWN, DEPRESSED OR HOPELESS: SEVERAL DAYS

## 2025-06-06 ENCOUNTER — TELEPHONE (OUTPATIENT)
Dept: FAMILY MEDICINE CLINIC | Age: 65
End: 2025-06-06

## 2025-06-06 NOTE — TELEPHONE ENCOUNTER
Patient returns my call.  I gave him the results of his US Scrotum per Dr Torres's review.  Patient verbalizes understanding.  He tells me that the swelling has gone down dramatically and he feels better.  He is back to work now. He wants to thank you for your help.

## 2025-06-06 NOTE — TELEPHONE ENCOUNTER
----- Message from Dr. Eligio Torres MD sent at 6/6/2025  1:24 PM EDT -----  Please call him. Test shows some inflammation of the L testicle as well as a fluid collection. If his pain and swelling are better, we don't have to do anything else. If not improved, will need for him to see the urologist.  smg

## 2025-06-06 NOTE — TELEPHONE ENCOUNTER
I have called patient's sister's phone number and left a message asking her to have the patient give the office a call for results.   09:00

## 2025-06-17 DIAGNOSIS — N45.2 ORCHITIS OF LEFT TESTICLE: ICD-10-CM

## 2025-06-17 DIAGNOSIS — N50.812 PAIN IN LEFT TESTICLE: ICD-10-CM

## (undated) DEVICE — FORCEPS ENDOSCP BX L230CM DIA2.8MM ALGTR CUP SPEC RETRV GI

## (undated) DEVICE — ENDO CARRY-ON PROCEDURE KIT INCLUDES ENZYMATIC SPONGE, GAUZE, BIOHAZARD LABEL, TRAY, LUBRICANT, DIRTY SCOPE LABEL, WATER LABEL, TRAY, DRAWSTRING PAD, AND DEFENDO 4-PIECE KIT.: Brand: ENDO CARRY-ON PROCEDURE KIT

## (undated) DEVICE — SOLUTION IRRIG 1000ML STRL H2O USP PLAS POUR BTL